# Patient Record
Sex: FEMALE | Race: WHITE | NOT HISPANIC OR LATINO | Employment: STUDENT | URBAN - METROPOLITAN AREA
[De-identification: names, ages, dates, MRNs, and addresses within clinical notes are randomized per-mention and may not be internally consistent; named-entity substitution may affect disease eponyms.]

---
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
## 2023-02-25 DIAGNOSIS — J45.31 MILD PERSISTENT ASTHMA WITH ACUTE EXACERBATION: ICD-10-CM

## 2023-02-28 NOTE — TELEPHONE ENCOUNTER
Pending Prescriptions:                       Disp   Refills    albuterol (PROAIR HFA/PROVENTIL HFA/VENTOL*8.5 g  0        Sig: INHALE 1-2 PUFFS INTO THE LUNGS EVERY 4 HOURS AS           NEEDED FOR SHORTNESS OF BREATH, DIFFICULTY           BREATHING OR WHEEZING.    Routing refill request to provider for review/approval because:  Patient needs to be seen because:  greater than 6 months since last appt

## 2023-03-01 RX ORDER — ALBUTEROL SULFATE 90 UG/1
AEROSOL, METERED RESPIRATORY (INHALATION)
Qty: 8.5 G | Refills: 0 | Status: SHIPPED | OUTPATIENT
Start: 2023-03-01 | End: 2023-05-18

## 2023-03-06 NOTE — TELEPHONE ENCOUNTER
Staff have attempted to reach patient on 3 occasions with no success.     ACT printed and placed in bin for mailing. Advised patient to return completed form to clinic.

## 2023-03-13 ASSESSMENT — ASTHMA QUESTIONNAIRES: ACT_TOTALSCORE: 16

## 2023-03-23 ENCOUNTER — TRANSFERRED RECORDS (OUTPATIENT)
Dept: HEALTH INFORMATION MANAGEMENT | Facility: CLINIC | Age: 18
End: 2023-03-23

## 2023-03-28 NOTE — PROGRESS NOTES
[facility] Medicine Clinic Visit - Interim History December 27, 2018    Initial Visit Date 12/11/2018  Initial Injury Date 11/28/18    PCP: Schoen, Gregory G Charlotte M Murphy is a 13  year old 8  month old female who is seen in follow up for a left ankle sprain. Since last visit on 12/11/2018 patient has had improvement. She has been out of the walking boot for 6 days. She has not done any activity yet.  She denies pain, stiffness, or soreness. She has been doing the range of motion exercises. She rates the pain at a  0/10 currently.  Symptoms are relieved with rest and walking boot (discontinued).  Symptoms are worsened by nothing at this time. She does continue to get some mild swelling. She feels that she would be able to get back to gymnastics slowly. She has gymnastics every day.    - Now ~ 4 weeks from initial injury      Review of Systems  Musculoskeletal: as above  Remainder of review of systems is negative including constitutional, eyes, ENT, CV, pulmonary, GI, , endocrine, skin, hematologic, and neurologic except as noted in HPI or medical history.    History reviewed. No pertinent past surgical/medical/family/social history other than as mentioned in HPI.    Patient Active Problem List   Diagnosis     Peanut allergy     Tibial plateau fracture, left, closed, initial encounter     Anxiety, Unspecified     Mild persistent asthma with acute exacerbation     Past Medical History:   Diagnosis Date     Mild intermittent asthma 10/16/2008     No past surgical history on file.  Family History   Problem Relation Age of Onset     Diabetes Maternal Grandfather      Respiratory Paternal Grandmother         lung cancer-smoker     Cancer Paternal Grandmother         lung cancer     Eye Disorder Maternal Grandmother      Social History     Socioeconomic History     Marital status: Single     Spouse name: Not on file     Number of children: Not on file     Years of education: Not on file     Highest education level:  "Not on file   Social Needs     Financial resource strain: Not on file     Food insecurity - worry: Not on file     Food insecurity - inability: Not on file     Transportation needs - medical: Not on file     Transportation needs - non-medical: Not on file   Occupational History     Not on file   Tobacco Use     Smoking status: Passive Smoke Exposure - Never Smoker     Smokeless tobacco: Never Used     Tobacco comment: when with grandparents   Substance and Sexual Activity     Alcohol use: No     Alcohol/week: 0.0 oz     Drug use: No     Sexual activity: No   Other Topics Concern     Not on file   Social History Narrative     Not on file         Current Outpatient Medications   Medication     albuterol (VENTOLIN HFA) 108 (90 Base) MCG/ACT inhaler     EPINEPHrine (EPIPEN/ADRENACLICK/OR ANY BX GENERIC EQUIV) 0.3 MG/0.3ML injection 2-pack     FLUoxetine (PROZAC) 10 MG capsule     fluticasone (FLOVENT HFA) 110 MCG/ACT inhaler     No current facility-administered medications for this visit.      Allergies   Allergen Reactions     Peanuts [Nuts] Rash         Objective:  BP 98/56   Ht 1.54 m (5' 0.63\")   Wt 56.2 kg (124 lb)   LMP 12/15/2018   BMI 23.72 kg/m      General: Alert and in no distress    Head: Normocephalic, atraumatic  Eyes: no scleral icterus or conjunctival erythema   Oropharynx:  Mucous membranes moist  Skin: no erythema, petechiae, or jaundice  CV: regular rhythm by palpation, 2+ distal pulses  Resp: normal respiratory effort without conversational dyspnea   Psych: normal mood and affect    Gait: Non-antalgic, appropriate coordination and balance   Neuro: Motor strength and sensation as noted below    Musculoskeletal:    Bilateral Foot and Ankle Exam:    Inspection:  -No swelling  -Bruising over dorsal left 3rd and 4th distal metatarsals    Tenderness:  None    ROM:        Full active ROM with ankle dorsiflexion, plantarflexion, inversion, eversion, great toe dorsiflexion, and great toe " plantarflexion    Strength:       ankle dorsiflexion 5/5 bilaterally       plantarflexion 5/5 bilaterally       inversion 5/5 bilaterally       eversion 5/5 bilaterally       great toe dorsiflexion 5/5 bilaterally       great toe plantarflexion 5/5 bilaterally    Special Tests:  -Able to do single leg hop without pain    Neurovascular:       2+ peripheral pulses bilaterally       sensation grossly intact      Radiology:  No new imaging obtained today  Recent Results (from the past 744 hour(s))   XR Ankle Left G/E 3 Views    Narrative    ANKLE LEFT THREE OR MORE VIEWS   11/28/2018 7:05 PM     INDICATION: Trauma.    COMPARISON: None.      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 This does not  appear to be an acute finding. No acute fracture or dislocation.    CHARLY CLEARY MD   X-ray lt Foot G/E 3 vws*    Narrative    FOOT LEFT THREE OR MORE VIEWS   11/28/2018 7:05 PM     INDICATION: Trauma.    COMPARISON: None.      Impression    IMPRESSION: Negative.    CHARLY CLEARY MD       Assessment:  1. Sprain of anterior talofibular ligament of left ankle, subsequent encounter        Plan:  Discussed the assessment with the patient and developed a plan together:  -Gradually return to gymnastics as able.  Letter provided.  -Continue home exercises.  -Ice or ice massage 15-20 minutes for pain relief or swelling as needed    -Follow up as needed.  Please call with questions or concerns.          Kiah Cabral MD, [facility]  [facility]       No

## 2023-05-18 ENCOUNTER — OFFICE VISIT (OUTPATIENT)
Dept: FAMILY MEDICINE | Facility: CLINIC | Age: 18
End: 2023-05-18
Payer: COMMERCIAL

## 2023-05-18 VITALS
DIASTOLIC BLOOD PRESSURE: 60 MMHG | RESPIRATION RATE: 18 BRPM | OXYGEN SATURATION: 99 % | BODY MASS INDEX: 19.98 KG/M2 | SYSTOLIC BLOOD PRESSURE: 102 MMHG | TEMPERATURE: 97.5 F | HEART RATE: 72 BPM | WEIGHT: 108.6 LBS | HEIGHT: 62 IN

## 2023-05-18 DIAGNOSIS — L72.9 INFECTED CYST OF SKIN: Primary | ICD-10-CM

## 2023-05-18 DIAGNOSIS — L08.9 INFECTED CYST OF SKIN: Primary | ICD-10-CM

## 2023-05-18 DIAGNOSIS — J45.31 MILD PERSISTENT ASTHMA WITH ACUTE EXACERBATION: ICD-10-CM

## 2023-05-18 DIAGNOSIS — J30.81 ALLERGIC RHINITIS DUE TO ANIMALS: ICD-10-CM

## 2023-05-18 DIAGNOSIS — L20.82 FLEXURAL ECZEMA: ICD-10-CM

## 2023-05-18 PROCEDURE — 99214 OFFICE O/P EST MOD 30 MIN: CPT | Performed by: STUDENT IN AN ORGANIZED HEALTH CARE EDUCATION/TRAINING PROGRAM

## 2023-05-18 RX ORDER — ALBUTEROL SULFATE 90 UG/1
1-2 AEROSOL, METERED RESPIRATORY (INHALATION) EVERY 4 HOURS PRN
Qty: 8.5 G | Refills: 1 | Status: SHIPPED | OUTPATIENT
Start: 2023-05-18 | End: 2024-02-06

## 2023-05-18 RX ORDER — FLUTICASONE PROPIONATE 110 UG/1
2 AEROSOL, METERED RESPIRATORY (INHALATION) 2 TIMES DAILY
Qty: 12 G | Refills: 1 | Status: SHIPPED | OUTPATIENT
Start: 2023-05-18 | End: 2024-05-24

## 2023-05-18 RX ORDER — INHALER, ASSIST DEVICES
SPACER (EA) MISCELLANEOUS
Qty: 1 EACH | Refills: 0 | Status: SHIPPED | OUTPATIENT
Start: 2023-05-18

## 2023-05-18 RX ORDER — CEPHALEXIN 500 MG/1
500 CAPSULE ORAL 2 TIMES DAILY
Qty: 14 CAPSULE | Refills: 0 | Status: SHIPPED | OUTPATIENT
Start: 2023-05-18 | End: 2023-05-25

## 2023-05-18 ASSESSMENT — ANXIETY QUESTIONNAIRES
4. TROUBLE RELAXING: NOT AT ALL
GAD7 TOTAL SCORE: 4
GAD7 TOTAL SCORE: 4
3. WORRYING TOO MUCH ABOUT DIFFERENT THINGS: NOT AT ALL
IF YOU CHECKED OFF ANY PROBLEMS ON THIS QUESTIONNAIRE, HOW DIFFICULT HAVE THESE PROBLEMS MADE IT FOR YOU TO DO YOUR WORK, TAKE CARE OF THINGS AT HOME, OR GET ALONG WITH OTHER PEOPLE: SOMEWHAT DIFFICULT
2. NOT BEING ABLE TO STOP OR CONTROL WORRYING: SEVERAL DAYS
GAD7 TOTAL SCORE: 4
8. IF YOU CHECKED OFF ANY PROBLEMS, HOW DIFFICULT HAVE THESE MADE IT FOR YOU TO DO YOUR WORK, TAKE CARE OF THINGS AT HOME, OR GET ALONG WITH OTHER PEOPLE?: SOMEWHAT DIFFICULT
1. FEELING NERVOUS, ANXIOUS, OR ON EDGE: SEVERAL DAYS
6. BECOMING EASILY ANNOYED OR IRRITABLE: SEVERAL DAYS
7. FEELING AFRAID AS IF SOMETHING AWFUL MIGHT HAPPEN: NOT AT ALL
7. FEELING AFRAID AS IF SOMETHING AWFUL MIGHT HAPPEN: NOT AT ALL
5. BEING SO RESTLESS THAT IT IS HARD TO SIT STILL: SEVERAL DAYS

## 2023-05-18 ASSESSMENT — ASTHMA QUESTIONNAIRES
QUESTION_2 LAST FOUR WEEKS HOW OFTEN HAVE YOU HAD SHORTNESS OF BREATH: ONCE A DAY
QUESTION_5 LAST FOUR WEEKS HOW WOULD YOU RATE YOUR ASTHMA CONTROL: SOMEWHAT CONTROLLED
QUESTION_3 LAST FOUR WEEKS HOW OFTEN DID YOUR ASTHMA SYMPTOMS (WHEEZING, COUGHING, SHORTNESS OF BREATH, CHEST TIGHTNESS OR PAIN) WAKE YOU UP AT NIGHT OR EARLIER THAN USUAL IN THE MORNING: ONCE OR TWICE
QUESTION_4 LAST FOUR WEEKS HOW OFTEN HAVE YOU USED YOUR RESCUE INHALER OR NEBULIZER MEDICATION (SUCH AS ALBUTEROL): ONE OR TWO TIMES PER DAY
ACT_TOTALSCORE: 15
QUESTION_1 LAST FOUR WEEKS HOW MUCH OF THE TIME DID YOUR ASTHMA KEEP YOU FROM GETTING AS MUCH DONE AT WORK, SCHOOL OR AT HOME: A LITTLE OF THE TIME
ACT_TOTALSCORE: 15

## 2023-05-18 ASSESSMENT — PATIENT HEALTH QUESTIONNAIRE - PHQ9
SUM OF ALL RESPONSES TO PHQ QUESTIONS 1-9: 6
SUM OF ALL RESPONSES TO PHQ QUESTIONS 1-9: 6
10. IF YOU CHECKED OFF ANY PROBLEMS, HOW DIFFICULT HAVE THESE PROBLEMS MADE IT FOR YOU TO DO YOUR WORK, TAKE CARE OF THINGS AT HOME, OR GET ALONG WITH OTHER PEOPLE: SOMEWHAT DIFFICULT

## 2023-05-18 ASSESSMENT — PAIN SCALES - GENERAL: PAINLEVEL: MILD PAIN (2)

## 2023-05-18 NOTE — PROGRESS NOTES
Assessment & Plan     Infected cyst of skin  Patient with infected cyst and appears to have surrounding cellulitis.  Did discuss I&D today.  Was able to drain with a significant amount of pus removed without making incision as things were already open.  No further underlying abscess palpated.  Encouraged her to let this continue to drain.  Plan for course of Keflex as she has no other risk factors for MRSA at this time.  Follow-up in clinic if things not completely resolved or recur.  - cephALEXin (KEFLEX) 500 MG capsule  Dispense: 14 capsule; Refill: 0    Mild persistent asthma with acute exacerbation  Symptoms uncontrolled at this time.  Recommend she restart Flovent and use her spacer with albuterol as needed.  Also discussed regarding Singulair but she did not note much improvement with this previously and wants to hold off now.  Continue to treat her allergies and avoid allergens.  - fluticasone (FLOVENT HFA) 110 MCG/ACT inhaler  Dispense: 12 g; Refill: 1  - albuterol (PROAIR HFA/PROVENTIL HFA/VENTOLIN HFA) 108 (90 Base) MCG/ACT inhaler  Dispense: 8.5 g; Refill: 1  - spacer (OPTICHAMBER CECILIO) holding chamber  Dispense: 1 each; Refill: 0    Allergic rhinitis due to animals  Continue antihistamine avoid allergens.     Flexural eczema  Treat allergies and use steroid cream as needed on her flexural surfaces.  Discussed regular lotion eating and avoidance of itching.      Acosta Mcneil MD  [facility]    Yosef Bellamy is a 18 year old, presenting for the following health issues:  Wound Check (Check wounds on her stomach, leg, and arm)        5/18/2023     9:18 AM   Additional Questions   Roomed by Sunita MURPHY     Wound Check    History of Present Illness       Reason for visit:  Infection  Symptom onset:  3-7 days ago  Symptoms include:  Redness & swelling  Symptom intensity:  Moderate  Symptom progression:  Worsening  Had these symptoms before:  No  What makes it worse:   "Leaning over  What makes it better:  Sky    She eats 0-1 servings of fruits and vegetables daily.She consumes 1 sweetened beverage(s) daily.She exercises with enough effort to increase her heart rate 10 to 19 minutes per day.  She exercises with enough effort to increase her heart rate 4 days per week.   She is taking medications regularly.    Today's PHQ-9         PHQ-9 Total Score: 6    PHQ-9 Q9 Thoughts of better off dead/self-harm past 2 weeks :   Not at all    How difficult have these problems made it for you to do your work, take care of things at home, or get along with other people: Somewhat difficult  Today's ALETHA-7 Score: 4         Review of Systems   Constitutional, HEENT, cardiovascular, pulmonary, gi and gu systems are negative, except as otherwise noted.      Objective    /60 (BP Location: Left arm, Patient Position: Chair)   Pulse 72   Temp 97.5  F (36.4  C) (Temporal)   Resp 18   Ht 1.577 m (5' 2.1\")   Wt 49.3 kg (108 lb 9.6 oz)   LMP 04/20/2023   SpO2 99%   BMI 19.80 kg/m    Body mass index is 19.8 kg/m .  Physical Exam   GENERAL: healthy, alert and no distress  EYES: Eyes grossly normal to inspection, PERRL and conjunctivae and sclerae normal  RESP: lungs clear to auscultation - no rales, rhonchi or wheezes  CV: regular rate and rhythm, normal S1 S2,   MS: no gross musculoskeletal defects noted, no edema  SKIN: Left lower abdominal cyst with surrounding erythema and drainage.  NEURO:  mentation intact and speech normal  PSYCH: mentation appears normal, affect normal/bright              "

## 2023-06-02 ENCOUNTER — HEALTH MAINTENANCE LETTER (OUTPATIENT)
Age: 18
End: 2023-06-02

## 2023-11-28 NOTE — Clinical Note
Gynecologic Annual Exam Note          GYN Annual Exam     Gynecologic Exam        Subjective     HPI  Yamel Don is a 43 y.o. female, , who presents for annual well woman exam as a established patient . Patient's last menstrual period was 2023 (exact date)..  Patient reports problems with: none.  Her periods occur every 25-35 days , lasting 4 days. The flow is moderate.. She reports dysmenorrhea is mild, occurring first 1-2 days of flow. Partner Status: Marital Status: . She is is sexually active. She has not had new partners.. STD testing recommendations have been explained to the patient and she does not desire STD testing. There were no changes to her medical or surgical history since her last visit..     Patient would like a mammogram ordered.    Additional OB/GYN History   Current contraception: contraceptive methods: None  Desires to: do not start contraception    Last Pap : 22. Result: negative. HPV: not done.   Last Completed Pap Smear            PAP SMEAR (Every 3 Years) Next due on 2025  LIQUID-BASED PAP SMEAR, [facility] ([facility])    2021  SCANNED - PAP SMEAR                  History of abnormal Pap smear: yes - HPV ---- last HPV testing was in =neg  Family history of uterine, colon, breast, or ovarian cancer: yes - mother - breast CA  Performs monthly Self-Breast Exam: no  Last mammogram: never. Needs a mammogram ordered     History of abnormal mammogram: no    Colonoscopy: has never had a colonoscopy.  Exercises Regularly: yes  Feelings of Anxiety or Depression: no  Tobacco Usage?: No       Current Outpatient Medications:     ibuprofen (ADVIL,MOTRIN) 600 MG tablet, Take 1 tablet by mouth Every 6 (Six) Hours As Needed for Mild Pain ., Disp: 35 tablet, Rfl: 1    multivitamin (THERAGRAN) tablet tablet, Take  by mouth Daily., Disp: , Rfl:     Omega-3 Fatty Acids (OMEGA 3 PO), Take 1 capsule by mouth Daily., Disp: , Rfl:     Probiotic  MG -thanks for this referral, Jesse. I am trying her on Zoloft instead of Prozac, to see if we can get some better improvement of her symptoms. Thank you,Jada Gunderson "Product (PROBIOTIC DAILY PO), Take 1 capsule by mouth Daily., Disp: , Rfl:     SUMAtriptan (IMITREX) 100 MG tablet, Take 1 tablet by mouth Daily As Needed., Disp: , Rfl:     valACYclovir (VALTREX) 500 MG tablet, 1 po qd, Disp: 90 tablet, Rfl: 3     Patient is requesting refills of Valtrex.    OB History          3    Para   1    Term   1       0    AB   2    Living   1         SAB   1    IAB   0    Ectopic   0    Molar        Multiple   0    Live Births   1                Past Medical History:   Diagnosis Date    HPV (human papilloma virus) infection     Infertility, female     unexplained    Migraine     Recurrent pregnancy loss, currently pregnant     miscarrigae x2        Past Surgical History:   Procedure Laterality Date    ANKLE SURGERY Left     Achilles rupture repair    DILATATION AND CURETTAGE  2014    OTHER SURGICAL HISTORY  2017    Oocyte retrieval for IVF    WISDOM TOOTH EXTRACTION         Health Maintenance   Topic Date Due    MAMMOGRAM  Never done    BMI FOLLOWUP  Never done    COLORECTAL CANCER SCREENING  Never done    TDAP/TD VACCINES (1 - Tdap) Never done    HEPATITIS C SCREENING  Never done    ANNUAL PHYSICAL  Never done    INFLUENZA VACCINE  Never done    COVID-19 Vaccine (2023- season) 2023    Annual Gynecologic Pelvic and Breast Exam  2024    PAP SMEAR  2025    Pneumococcal Vaccine 0-64  Aged Out       The additional following portions of the patient's history were reviewed and updated as appropriate: allergies, current medications, past family history, past medical history, past social history, past surgical history, and problem list.    Review of Systems   All other systems reviewed and are negative.        I have reviewed and agree with the HPI, ROS, and historical information as entered above. Radha Wheelerf, APRN          Objective   /80   Ht 157.5 cm (62\")   Wt 67.6 kg (149 lb)   LMP 2023 (Exact Date)   BMI 27.25 kg/m²     Physical " Exam  Vitals and nursing note reviewed. Exam conducted with a chaperone present.   Constitutional:       General: She is not in acute distress.     Appearance: Normal appearance. She is well-developed. She is not ill-appearing.   Neck:      Thyroid: No thyroid mass or thyromegaly.   Pulmonary:      Effort: Pulmonary effort is normal. No respiratory distress or retractions.   Chest:      Chest wall: No mass.   Breasts:     Right: Normal. No mass, nipple discharge, skin change or tenderness.      Left: Normal. No mass, nipple discharge, skin change or tenderness.   Abdominal:      General: There is no distension.      Palpations: Abdomen is soft. Abdomen is not rigid. There is no mass.      Tenderness: There is no abdominal tenderness. There is no guarding or rebound.      Hernia: No hernia is present. There is no hernia in the left inguinal area.   Genitourinary:     General: Normal vulva.      Labia:         Right: No rash, tenderness or lesion.         Left: No rash, tenderness or lesion.       Vagina: Normal. No vaginal discharge or lesions.      Cervix: Normal.      Uterus: Normal. Not enlarged, not fixed and not tender.       Adnexa: Right adnexa normal and left adnexa normal.        Right: No mass or tenderness.          Left: No mass or tenderness.        Rectum: No external hemorrhoid.   Musculoskeletal:      Cervical back: No muscular tenderness.   Skin:     General: Skin is warm and dry.   Neurological:      Mental Status: She is alert and oriented to person, place, and time.   Psychiatric:         Mood and Affect: Mood normal.         Behavior: Behavior normal.            Assessment and Plan    Problem List Items Addressed This Visit          Genitourinary and Reproductive     Hx of abnormal cervical Pap smear - Primary       Other    HSV (herpes simplex virus) anogenital infection    Relevant Medications    valACYclovir (VALTREX) 500 MG tablet    Genital herpes simplex    Relevant Medications     valACYclovir (VALTREX) 500 MG tablet     Other Visit Diagnoses       Women's annual routine gynecological examination        Breast cancer screening by mammogram        Relevant Orders    Mammo Screening Digital Tomosynthesis Bilateral With CAD    Screening for colon cancer        Relevant Orders    Cologuard - Stool, Per Rectum            GYN annual well woman exam.   Pap guidelines reviewed.  Agrees to Pap with HPV testing in one year per guidelines.  Reviewed monthly self breast exams.  Instructed to call with lumps, pain, or breast discharge.    Ordered Mammogram today  Reviewed exercise as a preventative health measures.   Reccommended Flu Vaccine in Fall of each year.  Symptoms of menopausal transition reviewed with patient.   RTC in 1 year or PRN with problems.  Discussed importance of routine colon cancer screening. Reviewed current guidelines. Recommended colonoscopy after age 45.  Return in about 1 year (around 11/28/2024) for Annual physical.     Radha Gamino, APRN  11/28/2023

## 2023-12-24 ENCOUNTER — APPOINTMENT (OUTPATIENT)
Dept: GENERAL RADIOLOGY | Facility: CLINIC | Age: 18
End: 2023-12-24
Attending: FAMILY MEDICINE
Payer: COMMERCIAL

## 2023-12-24 ENCOUNTER — HOSPITAL ENCOUNTER (EMERGENCY)
Facility: CLINIC | Age: 18
Discharge: HOME | End: 2023-12-24
Attending: FAMILY MEDICINE | Admitting: FAMILY MEDICINE
Payer: COMMERCIAL

## 2023-12-24 VITALS
OXYGEN SATURATION: 94 % | WEIGHT: 106.7 LBS | BODY MASS INDEX: 19.45 KG/M2 | TEMPERATURE: 99.1 F | SYSTOLIC BLOOD PRESSURE: 95 MMHG | HEART RATE: 90 BPM | DIASTOLIC BLOOD PRESSURE: 76 MMHG | RESPIRATION RATE: 16 BRPM

## 2023-12-24 DIAGNOSIS — J45.901 EXACERBATION OF ASTHMA, UNSPECIFIED ASTHMA SEVERITY, UNSPECIFIED WHETHER PERSISTENT: ICD-10-CM

## 2023-12-24 DIAGNOSIS — J10.1 INFLUENZA A: ICD-10-CM

## 2023-12-24 LAB
ALBUMIN SERPL BCG-MCNC: 4.4 G/DL (ref 3.5–5.2)
ALP SERPL-CCNC: 45 U/L (ref 40–150)
ALT SERPL W P-5'-P-CCNC: 10 U/L (ref 0–50)
ANION GAP SERPL CALCULATED.3IONS-SCNC: 12 MMOL/L (ref 7–15)
AST SERPL W P-5'-P-CCNC: 21 U/L (ref 0–45)
BASOPHILS # BLD AUTO: 0 10E3/UL (ref 0–0.2)
BASOPHILS NFR BLD AUTO: 0 %
BILIRUB SERPL-MCNC: 0.3 MG/DL
BUN SERPL-MCNC: 8.3 MG/DL (ref 6–20)
CALCIUM SERPL-MCNC: 8.3 MG/DL (ref 8.6–10)
CHLORIDE SERPL-SCNC: 102 MMOL/L (ref 98–107)
CREAT SERPL-MCNC: 0.91 MG/DL (ref 0.51–0.95)
D DIMER PPP FEU-MCNC: <0.27 UG/ML FEU (ref 0–0.5)
DEPRECATED HCO3 PLAS-SCNC: 23 MMOL/L (ref 22–29)
EGFRCR SERPLBLD CKD-EPI 2021: >90 ML/MIN/1.73M2
EOSINOPHIL # BLD AUTO: 0 10E3/UL (ref 0–0.7)
EOSINOPHIL NFR BLD AUTO: 0 %
ERYTHROCYTE [DISTWIDTH] IN BLOOD BY AUTOMATED COUNT: 12.7 % (ref 10–15)
FLUAV RNA SPEC QL NAA+PROBE: POSITIVE
FLUBV RNA RESP QL NAA+PROBE: NEGATIVE
GLUCOSE SERPL-MCNC: 109 MG/DL (ref 70–99)
HCG INTACT+B SERPL-ACNC: <1 MIU/ML
HCT VFR BLD AUTO: 36.8 % (ref 35–47)
HGB BLD-MCNC: 13 G/DL (ref 11.7–15.7)
HOLD SPECIMEN: NORMAL
HOLD SPECIMEN: NORMAL
IMM GRANULOCYTES # BLD: 0 10E3/UL
IMM GRANULOCYTES NFR BLD: 0 %
LYMPHOCYTES # BLD AUTO: 1 10E3/UL (ref 0.8–5.3)
LYMPHOCYTES NFR BLD AUTO: 13 %
MCH RBC QN AUTO: 32.2 PG (ref 26.5–33)
MCHC RBC AUTO-ENTMCNC: 35.3 G/DL (ref 31.5–36.5)
MCV RBC AUTO: 91 FL (ref 78–100)
MONOCYTES # BLD AUTO: 1 10E3/UL (ref 0–1.3)
MONOCYTES NFR BLD AUTO: 13 %
NEUTROPHILS # BLD AUTO: 5.1 10E3/UL (ref 1.6–8.3)
NEUTROPHILS NFR BLD AUTO: 74 %
NRBC # BLD AUTO: 0 10E3/UL
NRBC BLD AUTO-RTO: 0 /100
PLATELET # BLD AUTO: 260 10E3/UL (ref 150–450)
POTASSIUM SERPL-SCNC: 3.3 MMOL/L (ref 3.4–5.3)
PROT SERPL-MCNC: 6.9 G/DL (ref 6.4–8.3)
RBC # BLD AUTO: 4.04 10E6/UL (ref 3.8–5.2)
RSV RNA SPEC NAA+PROBE: NEGATIVE
SARS-COV-2 RNA RESP QL NAA+PROBE: NEGATIVE
SODIUM SERPL-SCNC: 137 MMOL/L (ref 135–145)
TROPONIN T SERPL HS-MCNC: <6 NG/L
WBC # BLD AUTO: 7.1 10E3/UL (ref 4–11)

## 2023-12-24 PROCEDURE — 93010 ELECTROCARDIOGRAM REPORT: CPT | Performed by: FAMILY MEDICINE

## 2023-12-24 PROCEDURE — 87637 SARSCOV2&INF A&B&RSV AMP PRB: CPT | Performed by: FAMILY MEDICINE

## 2023-12-24 PROCEDURE — 85025 COMPLETE CBC W/AUTO DIFF WBC: CPT | Performed by: FAMILY MEDICINE

## 2023-12-24 PROCEDURE — 93005 ELECTROCARDIOGRAM TRACING: CPT | Performed by: FAMILY MEDICINE

## 2023-12-24 PROCEDURE — 85379 FIBRIN DEGRADATION QUANT: CPT | Performed by: FAMILY MEDICINE

## 2023-12-24 PROCEDURE — 84702 CHORIONIC GONADOTROPIN TEST: CPT | Performed by: FAMILY MEDICINE

## 2023-12-24 PROCEDURE — 36415 COLL VENOUS BLD VENIPUNCTURE: CPT | Performed by: FAMILY MEDICINE

## 2023-12-24 PROCEDURE — 80053 COMPREHEN METABOLIC PANEL: CPT | Performed by: FAMILY MEDICINE

## 2023-12-24 PROCEDURE — 71045 X-RAY EXAM CHEST 1 VIEW: CPT

## 2023-12-24 PROCEDURE — 84484 ASSAY OF TROPONIN QUANT: CPT | Performed by: FAMILY MEDICINE

## 2023-12-24 PROCEDURE — 99285 EMERGENCY DEPT VISIT HI MDM: CPT | Mod: 25 | Performed by: FAMILY MEDICINE

## 2023-12-24 PROCEDURE — 99284 EMERGENCY DEPT VISIT MOD MDM: CPT | Mod: 25 | Performed by: FAMILY MEDICINE

## 2023-12-24 RX ORDER — PREDNISONE 20 MG/1
40 TABLET ORAL DAILY
Qty: 10 TABLET | Refills: 0 | Status: SHIPPED | OUTPATIENT
Start: 2023-12-24 | End: 2023-12-29

## 2023-12-24 ASSESSMENT — ACTIVITIES OF DAILY LIVING (ADL)
ADLS_ACUITY_SCORE: 35
ADLS_ACUITY_SCORE: 35

## 2023-12-24 NOTE — DISCHARGE INSTRUCTIONS
You tested positive for influenza A.  You do have a little bit of wheezing.  Use your albuterol inhaler as needed and take the prednisone 40 mg daily for the next 5 days to help calm down the inflammation in your breathing tubes.  If you are using your albuterol more than twice a week (which it sounds like you are) you should be on a controlling medication such as a steroid inhaler.  Recheck in clinic with your primary physician in the next 1-2 weeks to discuss this further.  Your blood work and chest x-ray were normal which is reassuring.  It was nice visiting with both of you tonight.  I hope you feel better soon. Thank you for being so patient with us during your ED stay.  We really do appreciate it. Kimberly Torres!    Thank you for choosing [facility]. We appreciate the opportunity to meet your urgent medical needs. Please let us know if we could have done anything to make your stay more satisfying.    After discharge, please closely monitor for any new or worsening symptoms. Return to the Emergency Department if you develop any acute worsening signs or symptoms.    If you had lab work, cultures or imaging studies done during your stay, the final results may still be pending. We will call you if your plan of care needs to change. However, if you are not improving as expected, please follow up with your primary care provider or clinic.     Start any prescription medications that were prescribed to you and take them as directed.     Please see additional handouts that may be pertinent to your condition.

## 2023-12-24 NOTE — ED TRIAGE NOTES
Pt has been having chest pain for the past two - four hours with SOB, radiates to her back.  Intermittent nausea with chest pain  Alert and oriented  Has      Triage Assessment (Adult)       Row Name 12/24/23 0021          Triage Assessment    Airway WDL WDL        Respiratory WDL    Respiratory WDL WDL        Cardiac WDL    Cardiac WDL X;chest pain        Chest Pain Assessment    Chest Pain Location midsternal     Chest Pain Radiation back     Character aching     Duration Chest pain four hours        Peripheral/Neurovascular WDL    Peripheral Neurovascular WDL WDL        Cognitive/Neuro/Behavioral WDL    Cognitive/Neuro/Behavioral WDL WDL

## 2023-12-24 NOTE — ED PROVIDER NOTES
History     Chief Complaint   Patient presents with    Shortness of Breath    Chest Pain     HPI  Mia Rey is a 18 year old female who presents to the ED tonight with a 4-hour history of chest pain that started gradually over about 20 minutes.  Fairly constant until the left for the ED just before midnight.  She had some associated shortness of breath and took her albuterol inhaler which may be helped a little bit.  She typically uses her inhaler once a day.  She was on a steroid inhaler many years ago but does not take that every day in fact it is usually a rare occurrence.  Has not seen a doctor in over a year.  Felt like she has some pressure like an air bubble in her chest.  No fevers.  Slight cough which has been nonproductive.    Had some abdominal cramps earlier.  No dysuria.  Last menstrual period was a week ago.  She is sexually active.  Uses no birth control.  Here with her fiancéVinayak.  He has not been ill.    Allergies:  Allergies   Allergen Reactions    Peanuts [Nuts] Rash       Problem List:    Patient Active Problem List    Diagnosis Date Noted    Deliberate self-cutting 04/04/2019     Priority: Medium    Trouble in sleeping 03/19/2019     Priority: Medium    Mild persistent asthma with acute exacerbation 09/11/2017     Priority: Medium    Mild major depression (H) 06/01/2017     Priority: Medium    ALETHA (generalized anxiety disorder) 06/01/2017     Priority: Medium    Tibial plateau fracture, left, closed, initial encounter 01/02/2017     Priority: Medium    Peanut allergy 08/12/2009     Priority: Medium        Past Medical History:    Past Medical History:   Diagnosis Date    Mild intermittent asthma 10/16/2008       Past Surgical History:    No past surgical history on file.    Family History:    Family History   Problem Relation Age of Onset    Diabetes Maternal Grandfather     Respiratory Paternal Grandmother         lung cancer-smoker    Cancer Paternal Grandmother         lung  cancer    Eye Disorder Maternal Grandmother        Social History:  Marital Status:  Single [1]  Social History     Tobacco Use    Smoking status: Never    Smokeless tobacco: Never    Tobacco comments:     no exposure   Vaping Use    Vaping Use: Never used   Substance Use Topics    Alcohol use: No     Alcohol/week: 0.0 standard drinks of alcohol    Drug use: No        Medications:    predniSONE (DELTASONE) 20 MG tablet  albuterol (PROAIR HFA/PROVENTIL HFA/VENTOLIN HFA) 108 (90 Base) MCG/ACT inhaler  EPINEPHrine (ANY BX GENERIC EQUIV) 0.3 MG/0.3ML injection 2-pack  fluticasone (CUTIVATE) 0.05 % external cream  fluticasone (FLOVENT HFA) 110 MCG/ACT inhaler  montelukast (SINGULAIR) 10 MG tablet  Prenatal Vit-Fe Fumarate-FA (PRENATAL MULTIVITAMIN W/IRON) 27-0.8 MG tablet  spacer (OPTICHAMBER CECILIO) holding chamber          Review of Systems   All other systems reviewed and are negative.      Physical Exam   BP: 115/78  Pulse: 105  Temp: 99.1  F (37.3  C)  Resp: 22  Weight: 48.4 kg (106 lb 11.2 oz)  SpO2: 96 %      Physical Exam  Constitutional:       General: She is not in acute distress.     Appearance: She is well-developed.   HENT:      Mouth/Throat:      Mouth: Mucous membranes are moist.   Cardiovascular:      Rate and Rhythm: Normal rate and regular rhythm.   Pulmonary:      Effort: Pulmonary effort is normal.      Breath sounds: Normal breath sounds.   Abdominal:      General: Abdomen is flat.      Tenderness: There is no abdominal tenderness.   Musculoskeletal:         General: No tenderness. Normal range of motion.      Right lower leg: No edema.      Left lower leg: No edema.   Skin:     Findings: Rash (atopy) present.   Neurological:      General: No focal deficit present.      Mental Status: She is alert and oriented to person, place, and time.         ED Course                 Procedures         EKG interpreted by me at 0102  Sinus rhythm at and 94 bpm.  No acute ischemic changes.  Normal EKG.    Critical  Care time:  none               Results for orders placed or performed during the hospital encounter of 12/24/23 (from the past 24 hour(s))   Symptomatic Influenza A/B, RSV, & SARS-CoV2 PCR (COVID-19) Nasopharyngeal    Specimen: Nasopharyngeal; Swab   Result Value Ref Range    Influenza A PCR Positive (A) Negative    Influenza B PCR Negative Negative    RSV PCR Negative Negative    SARS CoV2 PCR Negative Negative    Narrative    Testing was performed using the Xpert Xpress CoV2/Flu/RSV Assay on the [employer] Cardio GeneXpert Instrument. This test should be ordered for the detection of SARS-CoV-2, influenza, and RSV viruses in individuals who meet clinical and/or epidemiological criteria. Test performance is unknown in asymptomatic patients. This test is for in vitro diagnostic use under the FDA EUA for laboratories certified under CLIA to perform high or moderate complexity testing. This test has not been FDA cleared or approved. A negative result does not rule out the presence of PCR inhibitors in the specimen or target RNA in concentration below the limit of detection for the assay. If only one viral target is positive but coinfection with multiple targets is suspected, the sample should be re-tested with another FDA cleared, approved, or authorized test, if coinfection would change clinical management. This test was validated by the [email or web address]. These laboratories are certified under the Clinical Laboratory Improvement Amendments of 1988 (CLIA-88) as qualified to perform high complexity laboratory testing.   [place] Draw    Narrative    The following orders were created for panel order [place] Draw.  Procedure                               Abnormality         Status                     ---------                               -----------         ------                     Extra Green Top (Lithium...[[id number]]                      Final result               Extra Purple Top Tube[[id number]]                             Final result                 Please view results for these tests on the individual orders.   Extra Green Top (Lithium Heparin) Tube   Result Value Ref Range    Hold Specimen JIC    Extra Purple Top Tube   Result Value Ref Range    Hold Specimen JIC    CBC with platelets differential    Narrative    The following orders were created for panel order CBC with platelets differential.  Procedure                               Abnormality         Status                     ---------                               -----------         ------                     CBC with platelets and d...[[id number]]                      Final result                 Please view results for these tests on the individual orders.   Comprehensive metabolic panel   Result Value Ref Range    Sodium 137 135 - 145 mmol/L    Potassium 3.3 (L) 3.4 - 5.3 mmol/L    Carbon Dioxide (CO2) 23 22 - 29 mmol/L    Anion Gap 12 7 - 15 mmol/L    Urea Nitrogen 8.3 6.0 - 20.0 mg/dL    Creatinine 0.91 0.51 - 0.95 mg/dL    GFR Estimate >90 >60 mL/min/1.73m2    Calcium 8.3 (L) 8.6 - 10.0 mg/dL    Chloride 102 98 - 107 mmol/L    Glucose 109 (H) 70 - 99 mg/dL    Alkaline Phosphatase 45 40 - 150 U/L    AST 21 0 - 45 U/L    ALT 10 0 - 50 U/L    Protein Total 6.9 6.4 - 8.3 g/dL    Albumin 4.4 3.5 - 5.2 g/dL    Bilirubin Total 0.3 <=1.2 mg/dL   Troponin T, High Sensitivity   Result Value Ref Range    Troponin T, High Sensitivity <6 <=14 ng/L   CBC with platelets and differential   Result Value Ref Range    WBC Count 7.1 4.0 - 11.0 10e3/uL    RBC Count 4.04 3.80 - 5.20 10e6/uL    Hemoglobin 13.0 11.7 - 15.7 g/dL    Hematocrit 36.8 35.0 - 47.0 %    MCV 91 78 - 100 fL    MCH 32.2 26.5 - 33.0 pg    MCHC 35.3 31.5 - 36.5 g/dL    RDW 12.7 10.0 - 15.0 %    Platelet Count 260 150 - 450 10e3/uL    % Neutrophils 74 %    % Lymphocytes 13 %    % Monocytes 13 %    % Eosinophils 0 %    % Basophils 0 %    % Immature Granulocytes 0 %    NRBCs per 100 WBC 0 <1 /100    Absolute  Neutrophils 5.1 1.6 - 8.3 10e3/uL    Absolute Lymphocytes 1.0 0.8 - 5.3 10e3/uL    Absolute Monocytes 1.0 0.0 - 1.3 10e3/uL    Absolute Eosinophils 0.0 0.0 - 0.7 10e3/uL    Absolute Basophils 0.0 0.0 - 0.2 10e3/uL    Absolute Immature Granulocytes 0.0 <=0.4 10e3/uL    Absolute NRBCs 0.0 10e3/uL   HCG quantitative pregnancy   Result Value Ref Range    hCG Quantitative <1 <5 mIU/mL   D dimer quantitative   Result Value Ref Range    D-Dimer Quantitative <0.27 0.00 - 0.50 ug/mL FEU    Narrative    This D-dimer assay is intended for use in conjunction with a clinical pretest probability assessment model to exclude pulmonary embolism (PE) and deep venous thrombosis (DVT) in outpatients suspected of PE or DVT. The cut-off value is 0.50 ug/mL FEU.   XR Chest Port 1 View    Narrative    EXAM: CHEST SINGLE VIEW PORTABLE  LOCATION: [facility]  DATE: 12/24/2023    INDICATION: Chest pain. Shortness of breath. Influenza.  COMPARISON: None.    FINDINGS: The lungs are clear. Normal size cardiac silhouette.      Impression    IMPRESSION: No evidence of active cardiopulmonary disease.        Medications - No data to display    Assessments & Plan (with Medical Decision Making)  18-year-old with underlying asthma uses her albuterol almost daily even when she is doing well.  Is not taking her Flovent inhaler any longer nor her Singulair.  Has not seen a doctor in about a year she thinks.  Started with some chest pain gradually tonight.  Slight improvement with her albuterol inhaler.  Nonproductive cough.  Intermittent nausea.  Influenza came back positive.  COVID and RSV were negative.  Initially I heard no wheezing but later on in the ED stay she had some mild wheezing.  She will use her albuterol inhaler and we will get her on some prednisone 40 mg daily x 5 days.  Strong encouraged her to follow-up with her primary physician to discuss getting back on a steroid inhaler as she is using her albuterol  almost daily.  Blood work and chest x-ray are unremarkable.  We discussed reportable signs when to return.  Verbal and written discharge instructions given.  She is comfortable with this plan.       I have reviewed the nursing notes.    I have reviewed the findings, diagnosis, plan and need for follow up with the patient.           Medical Decision Making  The patient's presentation was of moderate complexity (an undiagnosed new problem with uncertain diagnosis).    The patient's evaluation involved:  ordering and/or review of 3+ test(s) in this encounter (see separate area of note for details)    The patient's management necessitated moderate risk (prescription drug management including medications given in the ED).        New Prescriptions    PREDNISONE (DELTASONE) 20 MG TABLET    Take 2 tablets (40 mg) by mouth daily for 5 days       Final diagnoses:   Influenza A   Exacerbation of asthma, unspecified asthma severity, unspecified whether persistent       12/24/2023   [facility] EMERGENCY DEPT       Marshal Alexander MD  12/24/23 0336

## 2024-02-06 ENCOUNTER — MYC REFILL (OUTPATIENT)
Dept: FAMILY MEDICINE | Facility: OTHER | Age: 19
End: 2024-02-06
Payer: COMMERCIAL

## 2024-02-06 ENCOUNTER — MYC REFILL (OUTPATIENT)
Dept: FAMILY MEDICINE | Facility: CLINIC | Age: 19
End: 2024-02-06
Payer: COMMERCIAL

## 2024-02-06 DIAGNOSIS — Z91.010 PEANUT ALLERGY: ICD-10-CM

## 2024-02-06 DIAGNOSIS — J45.31 MILD PERSISTENT ASTHMA WITH ACUTE EXACERBATION: ICD-10-CM

## 2024-02-06 RX ORDER — ALBUTEROL SULFATE 90 UG/1
1-2 AEROSOL, METERED RESPIRATORY (INHALATION) EVERY 4 HOURS PRN
Qty: 8.5 G | Refills: 1 | Status: SHIPPED | OUTPATIENT
Start: 2024-02-06 | End: 2024-04-18

## 2024-02-06 RX ORDER — EPINEPHRINE 0.3 MG/.3ML
INJECTION SUBCUTANEOUS
Qty: 1 ML | Refills: 2 | Status: SHIPPED | OUTPATIENT
Start: 2024-02-06

## 2024-04-18 ENCOUNTER — MYC REFILL (OUTPATIENT)
Dept: FAMILY MEDICINE | Facility: CLINIC | Age: 19
End: 2024-04-18
Payer: COMMERCIAL

## 2024-04-18 DIAGNOSIS — J45.31 MILD PERSISTENT ASTHMA WITH ACUTE EXACERBATION: ICD-10-CM

## 2024-04-20 RX ORDER — ALBUTEROL SULFATE 90 UG/1
1-2 AEROSOL, METERED RESPIRATORY (INHALATION) EVERY 4 HOURS PRN
Qty: 8.5 G | Refills: 1 | Status: SHIPPED | OUTPATIENT
Start: 2024-04-20 | End: 2024-05-24

## 2024-05-24 ENCOUNTER — MYC REFILL (OUTPATIENT)
Dept: FAMILY MEDICINE | Facility: CLINIC | Age: 19
End: 2024-05-24
Payer: COMMERCIAL

## 2024-05-24 DIAGNOSIS — J45.31 MILD PERSISTENT ASTHMA WITH ACUTE EXACERBATION: ICD-10-CM

## 2024-05-28 RX ORDER — FLUTICASONE PROPIONATE 110 UG/1
2 AEROSOL, METERED RESPIRATORY (INHALATION) 2 TIMES DAILY
Qty: 12 G | Refills: 1 | Status: SHIPPED | OUTPATIENT
Start: 2024-05-28 | End: 2024-08-21

## 2024-05-28 RX ORDER — ALBUTEROL SULFATE 90 UG/1
1-2 AEROSOL, METERED RESPIRATORY (INHALATION) EVERY 4 HOURS PRN
Qty: 8.5 G | Refills: 1 | Status: SHIPPED | OUTPATIENT
Start: 2024-05-28 | End: 2024-06-12

## 2024-05-30 ENCOUNTER — TELEPHONE (OUTPATIENT)
Dept: FAMILY MEDICINE | Facility: CLINIC | Age: 19
End: 2024-05-30
Payer: COMMERCIAL

## 2024-05-30 DIAGNOSIS — J45.31 MILD PERSISTENT ASTHMA WITH ACUTE EXACERBATION: Primary | ICD-10-CM

## 2024-05-30 NOTE — TELEPHONE ENCOUNTER
Prior Authorization Retail Medication Request    Medication/Dose:   Diagnosis and ICD code (if different than what is on RX):    New/renewal/insurance change PA/secondary ins. PA:  Previously Tried and Failed:    Rationale:      Insurance   Primary: [employer]   Insurance ID:  [id number]    Secondary (if applicable):  Insurance ID:      Pharmacy Information (if different than what is on RX)  [facility] [place]  Phone:  [phone]  Fax:[phone]

## 2024-06-05 ENCOUNTER — MYC MEDICAL ADVICE (OUTPATIENT)
Dept: FAMILY MEDICINE | Facility: CLINIC | Age: 19
End: 2024-06-05

## 2024-06-08 ASSESSMENT — ASTHMA QUESTIONNAIRES
ACT_TOTALSCORE: 15
ACT_TOTALSCORE: 15
QUESTION_5 LAST FOUR WEEKS HOW WOULD YOU RATE YOUR ASTHMA CONTROL: SOMEWHAT CONTROLLED
QUESTION_1 LAST FOUR WEEKS HOW MUCH OF THE TIME DID YOUR ASTHMA KEEP YOU FROM GETTING AS MUCH DONE AT WORK, SCHOOL OR AT HOME: A LITTLE OF THE TIME
QUESTION_2 LAST FOUR WEEKS HOW OFTEN HAVE YOU HAD SHORTNESS OF BREATH: ONCE A DAY
QUESTION_4 LAST FOUR WEEKS HOW OFTEN HAVE YOU USED YOUR RESCUE INHALER OR NEBULIZER MEDICATION (SUCH AS ALBUTEROL): ONE OR TWO TIMES PER DAY
QUESTION_3 LAST FOUR WEEKS HOW OFTEN DID YOUR ASTHMA SYMPTOMS (WHEEZING, COUGHING, SHORTNESS OF BREATH, CHEST TIGHTNESS OR PAIN) WAKE YOU UP AT NIGHT OR EARLIER THAN USUAL IN THE MORNING: ONCE OR TWICE

## 2024-06-11 NOTE — TELEPHONE ENCOUNTER
PA Initiation    Medication: FLUTICASONE PROPIONATE  MCG/ACT IN AERO  Insurance Company: [employer] [place] - Phone [phone] Fax [phone]  Pharmacy Filling the Rx: [address]   Filling Pharmacy Phone: [phone]  Filling Pharmacy Fax:    Start Date: 6/11/2024

## 2024-06-12 ENCOUNTER — MYC REFILL (OUTPATIENT)
Dept: FAMILY MEDICINE | Facility: CLINIC | Age: 19
End: 2024-06-12
Payer: COMMERCIAL

## 2024-06-12 DIAGNOSIS — J45.31 MILD PERSISTENT ASTHMA WITH ACUTE EXACERBATION: ICD-10-CM

## 2024-06-12 RX ORDER — ALBUTEROL SULFATE 90 UG/1
1-2 AEROSOL, METERED RESPIRATORY (INHALATION) EVERY 4 HOURS PRN
Qty: 18 G | Refills: 0 | Status: SHIPPED | OUTPATIENT
Start: 2024-06-12 | End: 2024-08-28

## 2024-06-13 NOTE — TELEPHONE ENCOUNTER
PRIOR AUTHORIZATION DENIED    Medication: FLUTICASONE PROPIONATE  MCG/ACT IN AERO  Insurance Company: [employer] [place] - Phone [phone] Fax [phone]  Denial Date: 6/13/2024  Denial Reason(s): Needs to try/fail a formulary alternative within the past 90 days      Appeal Information:   Patient Notified: No

## 2024-06-22 ENCOUNTER — HEALTH MAINTENANCE LETTER (OUTPATIENT)
Age: 19
End: 2024-06-22

## 2024-08-21 ENCOUNTER — OFFICE VISIT (OUTPATIENT)
Dept: ALLERGY | Facility: OTHER | Age: 19
End: 2024-08-21
Payer: COMMERCIAL

## 2024-08-21 VITALS
WEIGHT: 114.2 LBS | BODY MASS INDEX: 20.23 KG/M2 | HEART RATE: 71 BPM | HEIGHT: 63 IN | DIASTOLIC BLOOD PRESSURE: 65 MMHG | SYSTOLIC BLOOD PRESSURE: 105 MMHG | OXYGEN SATURATION: 100 %

## 2024-08-21 DIAGNOSIS — T78.1XXA REACTION TO FOOD, INITIAL ENCOUNTER: ICD-10-CM

## 2024-08-21 DIAGNOSIS — J45.40 MODERATE PERSISTENT ASTHMA WITHOUT COMPLICATION: ICD-10-CM

## 2024-08-21 DIAGNOSIS — T78.49XA OTHER ALLERGY, INITIAL ENCOUNTER: ICD-10-CM

## 2024-08-21 DIAGNOSIS — L30.9 DERMATITIS: ICD-10-CM

## 2024-08-21 DIAGNOSIS — J30.0 CHRONIC VASOMOTOR RHINITIS: Primary | ICD-10-CM

## 2024-08-21 LAB
BASOPHILS # BLD AUTO: 0.1 10E3/UL (ref 0–0.2)
BASOPHILS NFR BLD AUTO: 1 %
EOSINOPHIL # BLD AUTO: 0.4 10E3/UL (ref 0–0.7)
EOSINOPHIL NFR BLD AUTO: 8 %
ERYTHROCYTE [DISTWIDTH] IN BLOOD BY AUTOMATED COUNT: 12.1 % (ref 10–15)
HCT VFR BLD AUTO: 40.7 % (ref 35–47)
HGB BLD-MCNC: 14 G/DL (ref 11.7–15.7)
IMM GRANULOCYTES # BLD: 0 10E3/UL
IMM GRANULOCYTES NFR BLD: 0 %
LYMPHOCYTES # BLD AUTO: 2.5 10E3/UL (ref 0.8–5.3)
LYMPHOCYTES NFR BLD AUTO: 50 %
MCH RBC QN AUTO: 31.5 PG (ref 26.5–33)
MCHC RBC AUTO-ENTMCNC: 34.4 G/DL (ref 31.5–36.5)
MCV RBC AUTO: 92 FL (ref 78–100)
MONOCYTES # BLD AUTO: 0.4 10E3/UL (ref 0–1.3)
MONOCYTES NFR BLD AUTO: 8 %
NEUTROPHILS # BLD AUTO: 1.7 10E3/UL (ref 1.6–8.3)
NEUTROPHILS NFR BLD AUTO: 33 %
PLATELET # BLD AUTO: 292 10E3/UL (ref 150–450)
RBC # BLD AUTO: 4.44 10E6/UL (ref 3.8–5.2)
WBC # BLD AUTO: 5.1 10E3/UL (ref 4–11)

## 2024-08-21 PROCEDURE — 99204 OFFICE O/P NEW MOD 45 MIN: CPT | Performed by: ALLERGY & IMMUNOLOGY

## 2024-08-21 PROCEDURE — 99000 SPECIMEN HANDLING OFFICE-LAB: CPT | Performed by: ALLERGY & IMMUNOLOGY

## 2024-08-21 PROCEDURE — 86008 ALLG SPEC IGE RECOMB EA: CPT | Mod: 59 | Performed by: ALLERGY & IMMUNOLOGY

## 2024-08-21 PROCEDURE — 86003 ALLG SPEC IGE CRUDE XTRC EA: CPT | Mod: 90 | Performed by: ALLERGY & IMMUNOLOGY

## 2024-08-21 PROCEDURE — 85025 COMPLETE CBC W/AUTO DIFF WBC: CPT | Performed by: ALLERGY & IMMUNOLOGY

## 2024-08-21 PROCEDURE — 82785 ASSAY OF IGE: CPT | Performed by: ALLERGY & IMMUNOLOGY

## 2024-08-21 PROCEDURE — 36415 COLL VENOUS BLD VENIPUNCTURE: CPT | Performed by: ALLERGY & IMMUNOLOGY

## 2024-08-21 RX ORDER — BUDESONIDE AND FORMOTEROL FUMARATE DIHYDRATE 80; 4.5 UG/1; UG/1
2 AEROSOL RESPIRATORY (INHALATION) 2 TIMES DAILY
Qty: 10.2 G | Refills: 4 | Status: SHIPPED | OUTPATIENT
Start: 2024-08-21

## 2024-08-21 RX ORDER — TRIAMCINOLONE ACETONIDE 1 MG/G
OINTMENT TOPICAL
Qty: 80 G | Refills: 3 | Status: SHIPPED | OUTPATIENT
Start: 2024-08-21

## 2024-08-21 RX ORDER — AZELASTINE 1 MG/ML
2 SPRAY, METERED NASAL 2 TIMES DAILY PRN
Qty: 30 ML | Refills: 3 | Status: SHIPPED | OUTPATIENT
Start: 2024-08-21

## 2024-08-21 ASSESSMENT — ASTHMA QUESTIONNAIRES: ACT_TOTALSCORE: 15

## 2024-08-21 NOTE — PROGRESS NOTES
RN reviewed Anaphylaxis Action Plan with patient. Educated on the symptoms and treatment of anaphylaxis. Went through the different ways that a reaction can present, and the body systems that it can affect. Patient verbalized understanding. Copy given to patient.    LATANYA MartinezN, RN, PHN

## 2024-08-21 NOTE — LETTER
8/21/2024      Mia Rey  [address] [facility] [id number]      Dear Colleague,    Thank you for referring your patient, Mia Rey, to the [facility]. Please see a copy of my visit note below.    SUBJECTIVE:                                                                   Mia Rey presents today to our Allergy Clinic at [facility] for a new patient visit. She is a 19 year old female with concerns for food allergies and environmental allergies.    Chronic Rhinitis  The patient reports a long-standing history of chronic rhinitis, primarily during the spring, summer, and fall since childhood. Symptoms include nasal congestion, rhinorrhea, and occasional sneezing. These are typically triggered by exposure to fresh cut grass and animals such as cats, dogs, horses, and guinea pigs. However, she denies experiencing symptoms around her home pets. Symptom severity is usually rated at 4-5/10. She finds that over-the-counter loratadine provides effective symptom control, and she feels her nasal symptoms are not severe enough to warrant an allergy evaluation.    Asthma  Diagnosed with asthma during childhood, the patient denies any history of hospitalizations. Her asthma manifests as shortness of breath, wheezing, coughing, and occasional chest tightness. Triggers include allergens, exertion, cold air, and respiratory infections. She uses Qvar 40 mcg 1 puff twice daily but reports suboptimal symptom control, requiring daily use of albuterol, though there are times she skips it. Albuterol provides relief within five minutes. She experienced an asthma exacerbation in December 2023 during an influenza infection, which required prednisone. She occasionally smokes marijuana, approximately five times per year.    Food Allergy  The patient avoids both peanuts and tree nuts due to childhood reactions. She recalls experiencing hives and vomiting  after peanut exposure but does not recall specific reactions to tree nuts, though she avoids them. She also reports throat itchiness after consuming coconut years ago and has since avoided it. In 2005, her peanut IgE was 18.9 KU/L, increasing to >100 KU/L by 2007. She typically experiences bloating after consuming foods containing eggs and occasionally develops an itchy throat when consuming larger amounts. Her egg white IgE was 7.18 KU/L in 2005. Recently, she suspects a fish allergy, specifically to salmon, with symptoms including itchy throat and throat tightness within minutes of ingestion, occurring on at least two occasions. These symptoms resolve within an hour after taking oral antihistamines. She has an EpiPen but does not consistently carry it with her.    Dermatitis  The patient has a history of eczema, primarily affecting her hands, and occasionally the inside of her elbows and the backs of her knees. She experienced a severe flare approximately a year and a half ago, but her symptoms have since improved. She uses a steroid cream for management but is unsure of the specific type.                 Patient Active Problem List   Diagnosis     Peanut allergy     Tibial plateau fracture, left, closed, initial encounter     Mild major depression (H)     ALETHA (generalized anxiety disorder)     Mild persistent asthma with acute exacerbation     Trouble in sleeping     Deliberate self-cutting       Past Medical History:   Diagnosis Date     Mild intermittent asthma 10/16/2008      Problem (# of Occurrences) Relation (Name,Age of Onset)    Cancer (1) Paternal Grandmother: lung cancer    Diabetes (1) Maternal Grandfather    Eye Disorder (1) Maternal Grandmother    Respiratory (1) Paternal Grandmother: lung cancer-smoker          No past surgical history on file.  Social History     Socioeconomic History     Marital status: Single     Spouse name: None     Number of children: None     Years of education: None      Highest education level: None   Tobacco Use     Smoking status: Never     Smokeless tobacco: Never     Tobacco comments:     no exposure   Vaping Use     Vaping status: Never Used   Substance and Sexual Activity     Alcohol use: No     Alcohol/week: 0.0 standard drinks of alcohol     Drug use: No     Sexual activity: Yes     Partners: Male     Birth control/protection: Condom, Pill   Social History Narrative    ENVIRONMENTAL HISTORY: [place] The home is heated with a radiant heat. They does not have central air conditioning. The patient's bedroom is furnished with none.  Pets inside the house include 2 cat(s) and 3 dog(s). There is no history of cockroach or mice infestation. There is/are 1 smokers in the house.  The house does have a damp basement.                Current Outpatient Medications:      albuterol (PROAIR HFA/PROVENTIL HFA/VENTOLIN HFA) 108 (90 Base) MCG/ACT inhaler, Inhale 1-2 puffs into the lungs every 4 hours as needed for shortness of breath, wheezing or cough, Disp: 18 g, Rfl: 0     azelastine (ASTELIN) 0.1 % nasal spray, Spray 2 sprays into both nostrils 2 times daily as needed for rhinitis., Disp: 30 mL, Rfl: 3     budesonide-formoterol (SYMBICORT) 80-4.5 MCG/ACT Inhaler, Inhale 2 puffs into the lungs 2 times daily., Disp: 10.2 g, Rfl: 4     EPINEPHrine (ANY BX GENERIC EQUIV) 0.3 MG/0.3ML injection 2-pack, INJECT THE CONTENTS OF 1 SYRINGE (0.3ML) INTO THE MUSCLE ONCE AS NEEDED FOR ANAPHYLAXIS, Disp: 1 mL, Rfl: 2     fluticasone (CUTIVATE) 0.05 % external cream, Apply topically 2 times daily, Disp: 30 g, Rfl: 1     spacer (OPTICHAMBER CECILIO) holding chamber, Use with inhaler, Disp: 1 each, Rfl: 0     triamcinolone (KENALOG) 0.1 % external ointment, Apply sparingly to affected area twice daily as needed not longer than 14 days in a row. Do not apply on face, neck, armpits and groin area., Disp: 80 g, Rfl: 3     Prenatal Vit-Fe Fumarate-FA (PRENATAL  "MULTIVITAMIN W/IRON) 27-0.8 MG tablet, Take 1 tablet by mouth daily (Patient not taking: Reported on 5/18/2023), Disp: 90 tablet, Rfl: 3  Immunization History   Administered Date(s) Administered     Comvax (HIB/HepB) 2005, 2003, 03/30/2004     DTAP (<7y) 2003, 2003, 2003, 06/29/2004, 06/11/2008     HEPA 06/19/2007, 06/11/2008     HPV 05/01/2015, 07/17/2015, 01/27/2016     Influenza (H1N1) 11/16/2009     Influenza (IIV3) PF 2003, 11/14/2006, 10/15/2007, 10/16/2008, 10/11/2010, 11/04/2011, 12/27/2012     Influenza Vaccine >6 months,quad, PF 11/08/2013, 10/22/2014, 10/14/2015     MMR 03/30/2004, 06/11/2008     Meningococcal ACWY (Menactra ) 05/01/2015     Pneumococcal (PCV 7) 2003, 2003, 2003     Poliovirus, inactivated (IPV) 2003, 2003, 2003, 06/11/2008     TDAP Vaccine (Boostrix) 05/01/2015     Varicella 06/29/2004, 06/11/2008     Allergies   Allergen Reactions     Peanuts [Nuts] Rash     OBJECTIVE:                                                                 /65   Pulse 71   Ht 1.59 m (5' 2.6\")   Wt 51.8 kg (114 lb 3.2 oz)   SpO2 100%   BMI 20.49 kg/m              Physical Exam  Vitals and nursing note reviewed.   Constitutional:       General: She is not in acute distress.     Appearance: She is not ill-appearing, toxic-appearing or diaphoretic.   HENT:      Head: Normocephalic and atraumatic.      Right Ear: Tympanic membrane, ear canal and external ear normal.      Left Ear: Tympanic membrane, ear canal and external ear normal.      Nose: Septal deviation (rightward) present. No mucosal edema, congestion or rhinorrhea.      Right Turbinates: Enlarged and swollen.      Left Turbinates: Enlarged and swollen.      Comments: Transverse nasal crease noted      Mouth/Throat:      Lips: Pink.      Mouth: Mucous membranes are moist.      Pharynx: Oropharynx is clear. No pharyngeal swelling, oropharyngeal exudate, posterior oropharyngeal " erythema or uvula swelling.   Eyes:      General:         Right eye: No discharge.         Left eye: No discharge.      Conjunctiva/sclera: Conjunctivae normal.   Cardiovascular:      Rate and Rhythm: Normal rate and regular rhythm.      Heart sounds: Normal heart sounds. No murmur heard.  Pulmonary:      Effort: Pulmonary effort is normal. Prolonged expiration present. No respiratory distress.      Breath sounds: Normal breath sounds. No stridor, decreased air movement or transmitted upper airway sounds. No decreased breath sounds, wheezing, rhonchi or rales.   Skin:     Findings: Rash present.      Comments: Mild redness in the left antecubital area and fingers   Neurological:      Mental Status: She is alert and oriented to person, place, and time.   Psychiatric:         Mood and Affect: Mood normal.         Behavior: Behavior normal.             WORKUP:     ACT 15    ASSESSMENT/PLAN:           Chronic vasomotor rhinitis    Symptoms since childhood.  Unable to perform SPT for aeroallergens today because the patient to an antihistamine yesterday.  -Ordered serum IgE for regional aeroallergen panel.  - Take loratadine or cetirizine 10 mg by mouth once daily as needed.  - Use azelastine 2 sprays in each nostril twice daily as needed.    - IgE  - Allergen cat epithellium IgE  - Allergen dog epithelium IgE  - Allergen Nito grass IgE  - Allergen orchard grass IgE  - Allergen teto IgE  - Allergen D farinae IgE  - Allergen D pteronyssinus IgE  - Allergen alternaria alternata IgE  - Allergen aspergillus fumigatus IgE  - Allergen cladosporium herbarum IgE  - Allergen Epicoccum purpurascens IgE  - Allergen penicillium notatum IgE  - Allergen jessica white IgE  - Allergen Cedar IgE  - Allergen cottonwood IgE  - Allergen elm IgE  - Allergen maple box elder IgE  - Allergen oak white IgE  - Allergen Red Clermont IgE  - Allergen silver  birch IgE  - Allergen Tree White Clermont IgE  - Allergen Ennis Tree  - Allergen white  pine IgE  - Allergen English plantain IgE  - Allergen giant ragweed IgE  - Allergen lamb's quarter IgE  - Allergen Mugwort IgE  - Allergen ragweed short IgE  - Allergen Sagebrush Wormwood IgE  - Allergen Sheep Sorrel IgE  - Allergen thistle Russian IgE  - Allergen Weed Nettle IgE  - Allergen, Kochia/Firebush  - azelastine (ASTELIN) 0.1 % nasal spray  Dispense: 30 mL; Refill: 3      Moderate persistent asthma without complication  The patient's asthma symptoms remain suboptimally controlled with current therapy.      -Discontinue Qvar.  -Start Symbicort 80/4.5 mcg, 2 puffs twice daily.  -Continue use of albuterol inhaler, 2-4 puffs every 4 hours as needed for chest tightness, wheezing, shortness of breath, or persistent cough. Use with a spacer/chamber device for optimal delivery.    -Ordered serum IgE for regional aeroallergen panel, general serum IgE, and CBC with differential to identify potential triggers and assess for further treatment options, including biologics if necessary.  -Ordered chest x-ray (2 views) to evaluate any structural lung abnormalities.  -Ordered pulmonary function tests (PFTs) to be completed before the next appointment to assess baseline lung function and guide further treatment adjustments.    - CBC with Platelets & Differential  - XR Chest 2 Views  - General PFT Lab (Please always keep checked)  - Pulmonary Function Test  - budesonide-formoterol (SYMBICORT) 80-4.5 MCG/ACT Inhaler  Dispense: 10.2 g; Refill: 4      Other allergy, initial encounter  Historical diagnosis of peanut and tree nut allergy.  She was tested for egg in the past and that the test was positive as well.  The patient reports symptoms with eggs, like throat itchiness if she eats too many egg containing products.  The new concern is possible effusion allergy.  - Continue strict avoidance.  Ordered serum IgE for peanut, tree nuts, including coconut, egg white, and fish, including component resolved diagnostics were  appropriate.  - Advised to carry EpiPen with her all the time.  Anaphylaxis action plan was reviewed and provided.    - Allergen almonds IgE  - Allergen brazil nut IgE  - Allergen Brazil Nut rBer e 1 IgE  - Allergen cashew IgE  - Allergen Cashew nut rAna o 3 IgE  - Allergen hazelnut IgE  - Allergen pecan nut IgE  - Allergen pistachio nut IgE  - Allergen Vantage rJug r 1 IgE  - Allergen Vantage rJug r 3 IgE  - Allergen walnuts IgE  - Hazelnut Component Allergy Panel  - Allergen coconut IgE  - Allergen peanut IgE  - Peanut Component Allergy Panel  - Allergen catfish IgE  - Allergen codfish IgE  - Allergen flounder IgE  - Allergen Kristel IgE  - Allergen Halibut IgE  - Allergen salmon IgE  - Allergen Trout IgE  - Allergen tuna IgE  - Allergen Walleye Ozark IgE  - Egg Components Allergy Panel  - Allergen egg white IgE    Dermatitis  Skin care regimen reviewed with the patient: Eliminate harsh soaps, i.e., Dial, zest, Indy spring;  Use mild soaps such as Cetaphil or Dove sensitive skin, avoid hot or cold showers, aggressive use of moisturizers including Vanicream, Cetaphil or Aquaphor.   Use triamcinolone 0.1% ointment: Apply sparingly to affected area two times daily as needed but not more than 14 days in a row. Spare face, armpits, neck, and groin.   - triamcinolone (KENALOG) 0.1 % external ointment  Dispense: 80 g; Refill: 3    Follow-up in 2 months or sooner if needed.    Thank you for allowing us to participate in the care of this patient. Please feel free to contact us if there are any questions or concerns about the patient.    Disclaimer: This note consists of symbols derived from keyboarding, dictation and/or voice recognition software. As a result, there may be errors in the script that have gone undetected. Please consider this when interpreting information found in this chart.    Consent was obtained from the patient to use an AI documentation tool in the creation of this note.     Ishmael Kuhn MD, FAAAAI,  FACSHERLYNI  Allergy and Asthma     [facility]      RN reviewed Anaphylaxis Action Plan with patient. Educated on the symptoms and treatment of anaphylaxis. Went through the different ways that a reaction can present, and the body systems that it can affect. Patient verbalized understanding. Copy given to patient.    LATANYA MartinezN, RN, PHN           Again, thank you for allowing me to participate in the care of your patient.        Sincerely,        Ishmael Kuhn MD

## 2024-08-21 NOTE — LETTER
ANAPHYLAXIS ALLERGY PLAN    Name: Mia Rey      :  2005    Allergy to:  peanut, tree nuts, and work up for fish and egg    Weight: 114 lbs 3.17 oz           Asthma:  Yes  (higher risk for a severe reaction)      Do not depend on antihistamines or inhalers (bronchodilators) to treat a severe reaction; USE EPINEPHRINE      MEDICATIONS/DOSES  Epinephrine:  EpiPen/Adrenaclick/Auvi-Q   Epinephrine dose:  0.3 mg IM  Antihistamine:  Zyrtec (Cetirizine)  Antihistamine dose:  20 mg  Other (e.g., inhaler-bronchodilator if wheezing):  albuterol 4 puffs       ANAPHYLAXIS ALLERGY PLAN (Page 2)  Patient:  Mia Rey  :  2005         Electronically signed on 2024 by:  Ishmael Kuhn MD  Parent/Guardian Authorization Signature:  ___________________________ Date:    FORM PROVIDED [employer] OF FOOD ALLERGY RESEARCH & EDUCATION (FARE) ([email or web address]) 2017

## 2024-08-21 NOTE — PROGRESS NOTES
SUBJECTIVE:                                                                   Mia Rey presents today to our Allergy Clinic at [facility] for a new patient visit. She is a 19 year old female with concerns for food allergies and environmental allergies.    Chronic Rhinitis  The patient reports a long-standing history of chronic rhinitis, primarily during the spring, summer, and fall since childhood. Symptoms include nasal congestion, rhinorrhea, and occasional sneezing. These are typically triggered by exposure to fresh cut grass and animals such as cats, dogs, horses, and guinea pigs. However, she denies experiencing symptoms around her home pets. Symptom severity is usually rated at 4-5/10. She finds that over-the-counter loratadine provides effective symptom control, and she feels her nasal symptoms are not severe enough to warrant an allergy evaluation.    Asthma  Diagnosed with asthma during childhood, the patient denies any history of hospitalizations. Her asthma manifests as shortness of breath, wheezing, coughing, and occasional chest tightness. Triggers include allergens, exertion, cold air, and respiratory infections. She uses Qvar 40 mcg 1 puff twice daily but reports suboptimal symptom control, requiring daily use of albuterol, though there are times she skips it. Albuterol provides relief within five minutes. She experienced an asthma exacerbation in December 2023 during an influenza infection, which required prednisone. She occasionally smokes marijuana, approximately five times per year.    Food Allergy  The patient avoids both peanuts and tree nuts due to childhood reactions. She recalls experiencing hives and vomiting after peanut exposure but does not recall specific reactions to tree nuts, though she avoids them. She also reports throat itchiness after consuming coconut years ago and has since avoided it. In 2005, her peanut IgE was 18.9 KU/L, increasing to >100  KU/L by 2007. She typically experiences bloating after consuming foods containing eggs and occasionally develops an itchy throat when consuming larger amounts. Her egg white IgE was 7.18 KU/L in 2005. Recently, she suspects a fish allergy, specifically to salmon, with symptoms including itchy throat and throat tightness within minutes of ingestion, occurring on at least two occasions. These symptoms resolve within an hour after taking oral antihistamines. She has an EpiPen but does not consistently carry it with her.    Dermatitis  The patient has a history of eczema, primarily affecting her hands, and occasionally the inside of her elbows and the backs of her knees. She experienced a severe flare approximately a year and a half ago, but her symptoms have since improved. She uses a steroid cream for management but is unsure of the specific type.                 Patient Active Problem List   Diagnosis    Peanut allergy    Tibial plateau fracture, left, closed, initial encounter    Mild major depression (H)    ALETHA (generalized anxiety disorder)    Mild persistent asthma with acute exacerbation    Trouble in sleeping    Deliberate self-cutting       Past Medical History:   Diagnosis Date    Mild intermittent asthma 10/16/2008      Problem (# of Occurrences) Relation (Name,Age of Onset)    Cancer (1) Paternal Grandmother: lung cancer    Diabetes (1) Maternal Grandfather    Eye Disorder (1) Maternal Grandmother    Respiratory (1) Paternal Grandmother: lung cancer-smoker          No past surgical history on file.  Social History     Socioeconomic History    Marital status: Single     Spouse name: None    Number of children: None    Years of education: None    Highest education level: None   Tobacco Use    Smoking status: Never    Smokeless tobacco: Never    Tobacco comments:     no exposure   Vaping Use    Vaping status: Never Used   Substance and Sexual Activity    Alcohol use: No     Alcohol/week: 0.0 standard drinks of  alcohol    Drug use: No    Sexual activity: Yes     Partners: Male     Birth control/protection: Condom, Pill   Social History Narrative    ENVIRONMENTAL HISTORY: [place] The home is heated with a radiant heat. They does not have central air conditioning. The patient's bedroom is furnished with none.  Pets inside the house include 2 cat(s) and 3 dog(s). There is no history of cockroach or mice infestation. There is/are 1 smokers in the house.  The house does have a damp basement.                Current Outpatient Medications:     albuterol (PROAIR HFA/PROVENTIL HFA/VENTOLIN HFA) 108 (90 Base) MCG/ACT inhaler, Inhale 1-2 puffs into the lungs every 4 hours as needed for shortness of breath, wheezing or cough, Disp: 18 g, Rfl: 0    azelastine (ASTELIN) 0.1 % nasal spray, Spray 2 sprays into both nostrils 2 times daily as needed for rhinitis., Disp: 30 mL, Rfl: 3    budesonide-formoterol (SYMBICORT) 80-4.5 MCG/ACT Inhaler, Inhale 2 puffs into the lungs 2 times daily., Disp: 10.2 g, Rfl: 4    EPINEPHrine (ANY BX GENERIC EQUIV) 0.3 MG/0.3ML injection 2-pack, INJECT THE CONTENTS OF 1 SYRINGE (0.3ML) INTO THE MUSCLE ONCE AS NEEDED FOR ANAPHYLAXIS, Disp: 1 mL, Rfl: 2    fluticasone (CUTIVATE) 0.05 % external cream, Apply topically 2 times daily, Disp: 30 g, Rfl: 1    spacer (OPTICHAMBER CECILIO) holding chamber, Use with inhaler, Disp: 1 each, Rfl: 0    triamcinolone (KENALOG) 0.1 % external ointment, Apply sparingly to affected area twice daily as needed not longer than 14 days in a row. Do not apply on face, neck, armpits and groin area., Disp: 80 g, Rfl: 3    Prenatal Vit-Fe Fumarate-FA (PRENATAL MULTIVITAMIN W/IRON) 27-0.8 MG tablet, Take 1 tablet by mouth daily (Patient not taking: Reported on 5/18/2023), Disp: 90 tablet, Rfl: 3  Immunization History   Administered Date(s) Administered    Comvax (HIB/HepB) 2003, 2003, 03/30/2004    DTAP (<7y) 2003,  "2003, 2003, 06/29/2004, 06/11/2008    HEPA 06/19/2007, 06/11/2008    HPV 05/01/2015, 07/17/2015, 01/27/2016    Influenza (H1N1) 11/16/2009    Influenza (IIV3) PF 2003, 11/14/2006, 10/15/2007, 10/16/2008, 10/11/2010, 11/04/2011, 12/27/2012    Influenza Vaccine >6 months,quad, PF 11/08/2013, 10/22/2014, 10/14/2015    MMR 03/30/2004, 06/11/2008    Meningococcal ACWY (Menactra ) 05/01/2015    Pneumococcal (PCV 7) 2003, 2003, 2003    Poliovirus, inactivated (IPV) 2003, 2003, 2003, 06/11/2008    TDAP Vaccine (Boostrix) 05/01/2015    Varicella 06/29/2004, 06/11/2008     Allergies   Allergen Reactions    Peanuts [Nuts] Rash     OBJECTIVE:                                                                 /65   Pulse 71   Ht 1.59 m (5' 2.6\")   Wt 51.8 kg (114 lb 3.2 oz)   SpO2 100%   BMI 20.49 kg/m              Physical Exam  Vitals and nursing note reviewed.   Constitutional:       General: She is not in acute distress.     Appearance: She is not ill-appearing, toxic-appearing or diaphoretic.   HENT:      Head: Normocephalic and atraumatic.      Right Ear: Tympanic membrane, ear canal and external ear normal.      Left Ear: Tympanic membrane, ear canal and external ear normal.      Nose: Septal deviation (rightward) present. No mucosal edema, congestion or rhinorrhea.      Right Turbinates: Enlarged and swollen.      Left Turbinates: Enlarged and swollen.      Comments: Transverse nasal crease noted      Mouth/Throat:      Lips: Pink.      Mouth: Mucous membranes are moist.      Pharynx: Oropharynx is clear. No pharyngeal swelling, oropharyngeal exudate, posterior oropharyngeal erythema or uvula swelling.   Eyes:      General:         Right eye: No discharge.         Left eye: No discharge.      Conjunctiva/sclera: Conjunctivae normal.   Cardiovascular:      Rate and Rhythm: Normal rate and regular rhythm.      Heart sounds: Normal heart sounds. No murmur " heard.  Pulmonary:      Effort: Pulmonary effort is normal. Prolonged expiration present. No respiratory distress.      Breath sounds: Normal breath sounds. No stridor, decreased air movement or transmitted upper airway sounds. No decreased breath sounds, wheezing, rhonchi or rales.   Skin:     Findings: Rash present.      Comments: Mild redness in the left antecubital area and fingers   Neurological:      Mental Status: She is alert and oriented to person, place, and time.   Psychiatric:         Mood and Affect: Mood normal.         Behavior: Behavior normal.             WORKUP:     ACT 15    ASSESSMENT/PLAN:           Chronic vasomotor rhinitis    Symptoms since childhood.  Unable to perform SPT for aeroallergens today because the patient to an antihistamine yesterday.  -Ordered serum IgE for regional aeroallergen panel.  - Take loratadine or cetirizine 10 mg by mouth once daily as needed.  - Use azelastine 2 sprays in each nostril twice daily as needed.    - IgE  - Allergen cat epithellium IgE  - Allergen dog epithelium IgE  - Allergen [place]ito grass IgE  - Allergen orchard grass IgE  - Allergen teto IgE  - Allergen D farinae IgE  - Allergen D pteronyssinus IgE  - Allergen alternaria alternata IgE  - Allergen aspergillus fumigatus IgE  - Allergen cladosporium herbarum IgE  - Allergen Epicoccum purpurascens IgE  - Allergen penicillium notatum IgE  - Allergen jessica white IgE  - Allergen Cedar IgE  - Allergen cottonwood IgE  - Allergen elm IgE  - Allergen maple box elder IgE  - Allergen oak white IgE  - Allergen Red [place] IgE  - Allergen silver  birch IgE  - Allergen Tree White [place] IgE  - Allergen [place] Tree  - Allergen white pine IgE  - Allergen English plantain IgE  - Allergen giant ragweed IgE  - Allergen lamb's quarter IgE  - Allergen Mugwort IgE  - Allergen ragweed short IgE  - Allergen S[place] IgE  - Allergen Sheep Sorrel IgE  - Allergen thistle [place] IgE  - Allergen Weed Nettle IgE  -  Allergen, Kochia/Firebush  - azelastine (ASTELIN) 0.1 % nasal spray  Dispense: 30 mL; Refill: 3      Moderate persistent asthma without complication  The patient's asthma symptoms remain suboptimally controlled with current therapy.      -Discontinue Qvar.  -Start Symbicort 80/4.5 mcg, 2 puffs twice daily.  -Continue use of albuterol inhaler, 2-4 puffs every 4 hours as needed for chest tightness, wheezing, shortness of breath, or persistent cough. Use with a spacer/chamber device for optimal delivery.    -Ordered serum IgE for regional aeroallergen panel, general serum IgE, and CBC with differential to identify potential triggers and assess for further treatment options, including biologics if necessary.  -Ordered chest x-ray (2 views) to evaluate any structural lung abnormalities.  -Ordered pulmonary function tests (PFTs) to be completed before the next appointment to assess baseline lung function and guide further treatment adjustments.    - CBC with Platelets & Differential  - XR Chest 2 Views  - General PFT Lab (Please always keep checked)  - Pulmonary Function Test  - budesonide-formoterol (SYMBICORT) 80-4.5 MCG/ACT Inhaler  Dispense: 10.2 g; Refill: 4      Other allergy, initial encounter  Historical diagnosis of peanut and tree nut allergy.  She was tested for egg in the past and that the test was positive as well.  The patient reports symptoms with eggs, like throat itchiness if she eats too many egg containing products.  The new concern is possible effusion allergy.  - Continue strict avoidance.  Ordered serum IgE for peanut, tree nuts, including coconut, egg white, and fish, including component resolved diagnostics were appropriate.  - Advised to carry EpiPen with her all the time.  Anaphylaxis action plan was reviewed and provided.    - Allergen almonds IgE  - Allergen brazil nut IgE  - Allergen [place] Nut rBer e 1 IgE  - Allergen cashew IgE  - Allergen Cashew nut rAna o 3 IgE  - Allergen hazelnut IgE  -  Allergen pecan nut IgE  - Allergen pistachio nut IgE  - Allergen [place] rJug r 1 IgE  - Allergen [place] rJug r 3 IgE  - Allergen walnuts IgE  - Hazelnut Component Allergy Panel  - Allergen coconut IgE  - Allergen peanut IgE  - Peanut Component Allergy Panel  - Allergen catfish IgE  - Allergen codfish IgE  - Allergen flounder IgE  - Allergen Kristel IgE  - Allergen Halibut IgE  - Allergen salmon IgE  - Allergen Trout IgE  - Allergen tuna IgE  - Allergen [place] IgE  - Egg Components Allergy Panel  - Allergen egg white IgE    Dermatitis  Skin care regimen reviewed with the patient: Eliminate harsh soaps, i.e., Dial, zest, [place] spring;  Use mild soaps such as Cetaphil or Dove sensitive skin, avoid hot or cold showers, aggressive use of moisturizers including Vanicream, Cetaphil or Aquaphor.   Use triamcinolone 0.1% ointment: Apply sparingly to affected area two times daily as needed but not more than 14 days in a row. Spare face, armpits, neck, and groin.   - triamcinolone (KENALOG) 0.1 % external ointment  Dispense: 80 g; Refill: 3    Follow-up in 2 months or sooner if needed.    Thank you for allowing us to participate in the care of this patient. Please feel free to contact us if there are any questions or concerns about the patient.    Disclaimer: This note consists of symbols derived from keyboarding, dictation and/or voice recognition software. As a result, there may be errors in the script that have gone undetected. Please consider this when interpreting information found in this chart.    Consent was obtained from the patient to use an AI documentation tool in the creation of this note.     Ishmael Kuhn MD, FAAAAI, FACAAI  Allergy and Asthma     [facility]

## 2024-08-22 ENCOUNTER — HOSPITAL ENCOUNTER (OUTPATIENT)
Dept: GENERAL RADIOLOGY | Facility: CLINIC | Age: 19
Discharge: HOME | End: 2024-08-22
Attending: ALLERGY & IMMUNOLOGY | Admitting: ALLERGY & IMMUNOLOGY
Payer: COMMERCIAL

## 2024-08-22 DIAGNOSIS — J45.40 MODERATE PERSISTENT ASTHMA WITHOUT COMPLICATION: ICD-10-CM

## 2024-08-22 LAB
BRAZIL NUT (RBER E) 1 IGE QN: <0.1 KU(A)/L
IGE SERPL-ACNC: 2948 KU/L (ref 0–114)

## 2024-08-22 PROCEDURE — 71046 X-RAY EXAM CHEST 2 VIEWS: CPT

## 2024-08-23 LAB
A ALTERNATA IGE QN: 0.41 KU(A)/L
A FUMIGATUS IGE QN: 0.7 KU(A)/L
ALLERGEN CASHEW NUT RANA O 3 IGE: 87.8 KU(A)/L
BRAZIL NUT IGE QN: 64.5 KU(A)/L
CASHEW NUT IGE QN: >100 KU(A)/L
DEPRECATED MISC ALLERGEN IGE RAST QL: NORMAL
ENGLISH WALNUT (RJUG R) 3 IGE QN: 0.6 KU(A)/L
GUINEA PIG EPITH IGE QN: 2.73 KU/L
WHITE ASH IGE QN: 0.86 KU(A)/L

## 2024-08-23 NOTE — RESULT ENCOUNTER NOTE
NextGxDXt message sent:   chest x-ray:  No acute cardiopulmonary changes.  -No changes in the plan.

## 2024-08-24 LAB
ALLERGEN WALNUT RJUG R 1 IGE: 90.2 KU(A)/L
ALMOND IGE QN: >100 KU(A)/L
C HERBARUM IGE QN: 5.23 KU(A)/L
CALIF WALNUT POLN IGE QN: 0.84 KU(A)/L
CAT DANDER IGG QN: >100 KU(A)/L
CATFISH IGE QN: 27 KU(A)/L
CEDAR IGE QN: 0.45 KU(A)/L
COCKSFOOT IGE QN: 21.2 KU(A)/L
COCONUT IGE QN: >100 KU(A)/L
CODFISH IGE QN: 25.5 KU(A)/L
COMMON RAGWEED IGE QN: 20 KU(A)/L
COR A 14 STORAGE PROTEIN 2S HAZELNUT: 36.2 KU(A)/L
COTTONWOOD IGE QN: 0.52 KU(A)/L
D FARINAE IGE QN: 0.24 KU(A)/L
D PTERONYSS IGE QN: 0.67 KU(A)/L
DOG DANDER+EPITH IGE QN: >100 KU(A)/L
E PURPURASCENS IGE QN: 0.92 KU(A)/L
EAST WHITE PINE IGE QN: 0.54 KU(A)/L
EGG WHITE IGE QN: 4.27 KU(A)/L
ENGL PLANTAIN IGE QN: 0.58 KU(A)/L
FIREBUSH IGE QN: 0.4 KU(A)/L
FLOUNDER IGE QN: 13.4 KU(A)/L
GIANT RAGWEED IGE QN: 8.67 KU(A)/L
GOOSEFOOT IGE QN: 0.72 KU(A)/L
HADDOCK IGE QN: 28.6 KU(A)/L
HALIBUT IGE QN: 20.1 KU(A)/L
HAZELNUT (NCOR A) 9 IGE QN: 97.9 KU(A)/L
HAZELNUT (RCOR A) 1 IGE QN: 34.1 KU(A)/L
HAZELNUT (RCOR A) 8 IGE QN: 0.73 KU(A)/L
HAZELNUT IGE QN: >100 KU(A)/L
HORSE DANDER IGE QN: 8.82 KU(A)/L
JOHNSON GRASS IGE QN: 8.57 KU(A)/L
MAPLE IGE QN: 2.27 KU(A)/L
MUGWORT IGE QN: 3.38 KU(A)/L
NETTLE IGE QN: 2.4 KU(A)/L
OVALB IGE QN: 0.22 KU(A)/L
OVOMUCOID IGE QN: 0.17 KU(A)/L
P NOTATUM IGE QN: <0.1 KU(A)/L
PEANUT (RARA H) 1 IGE QN: >100 KU(A)/L
PEANUT (RARA H) 2 IGE QN: >100 KU(A)/L
PEANUT (RARA H) 3 IGE QN: 45.7 KU(A)/L
PEANUT (RARA H) 6 IGE QN: 97.9 KU(A)/L
PEANUT (RARA H) 8 IGE QN: 3.77 KU(A)/L
PEANUT (RARA H) 9 IGE QN: 2.61 KU(A)/L
PEANUT IGE QN: >100 KU(A)/L
PECAN/HICK NUT IGE QN: 56.3 KU(A)/L
PISTACHIO IGE QN: >100 KU(A)/L
RABBIT EPITH IGE QN: 0.57 KU(A)/L
RED MULBERRY IGE QN: 1.11 KU(A)/L
SALMON IGE QN: 22.4 KU(A)/L
SALTWORT IGE QN: 0.53 KU(A)/L
SHEEP SORREL IGE QN: 0.63 KU(A)/L
SILVER BIRCH IGE QN: 29.9 KU(A)/L
TIMOTHY IGE QN: 14.2 KU(A)/L
TROUT IGE QN: 22.6 KU(A)/L
TUNA IGE QN: 6.59 KU(A)/L
WALLEYE PIKE IGE QN: 46.1 KU(A)/L
WALNUT IGE QN: 91.4 KU(A)/L
WHITE ELM IGE QN: 2.45 KU(A)/L
WHITE MULBERRY IGE QN: 0.46 KU(A)/L
WHITE OAK IGE QN: 1.92 KU(A)/L
WORMWOOD IGE QN: 3.37 KU(A)/L

## 2024-08-25 NOTE — RESULT ENCOUNTER NOTE
[employer] message sent:   Elevated total serum IgE, which is not uncommon for the patients with allergic rhinitis, asthma, food allergies, and/or eczema.  Positive serum IgE for cat, dog, horse dander, and guinea pig molds, grass pollen, tree pollen, and weed pollen.  Mild sensitivity to rabbit.  -Recommend avoidance measures.  -No changes in the previously discussed treatment plan.    Positive serum IgE for peanuts, tree nuts, coconut, fish, and egg.  Considering component resolved diagnostics for egg, I would consider skin test, and then oral food challenge test in the office setting.  - Continue strict avoidance of peanuts, tree nuts, coconut, and fish.

## 2024-08-28 ENCOUNTER — OFFICE VISIT (OUTPATIENT)
Dept: FAMILY MEDICINE | Facility: CLINIC | Age: 19
End: 2024-08-28
Payer: COMMERCIAL

## 2024-08-28 VITALS
SYSTOLIC BLOOD PRESSURE: 110 MMHG | HEART RATE: 72 BPM | HEIGHT: 62 IN | DIASTOLIC BLOOD PRESSURE: 60 MMHG | TEMPERATURE: 97.9 F | RESPIRATION RATE: 16 BRPM | BODY MASS INDEX: 20.74 KG/M2 | WEIGHT: 112.7 LBS | OXYGEN SATURATION: 99 %

## 2024-08-28 DIAGNOSIS — Z91.010 PEANUT ALLERGY: ICD-10-CM

## 2024-08-28 DIAGNOSIS — Z11.59 NEED FOR HEPATITIS C SCREENING TEST: ICD-10-CM

## 2024-08-28 DIAGNOSIS — R21 RASH: ICD-10-CM

## 2024-08-28 DIAGNOSIS — Z11.3 SCREENING FOR STDS (SEXUALLY TRANSMITTED DISEASES): ICD-10-CM

## 2024-08-28 DIAGNOSIS — Z00.00 ROUTINE GENERAL MEDICAL EXAMINATION AT A HEALTH CARE FACILITY: Primary | ICD-10-CM

## 2024-08-28 DIAGNOSIS — Z11.4 SCREENING FOR HIV (HUMAN IMMUNODEFICIENCY VIRUS): ICD-10-CM

## 2024-08-28 DIAGNOSIS — J45.31 MILD PERSISTENT ASTHMA WITH ACUTE EXACERBATION: ICD-10-CM

## 2024-08-28 DIAGNOSIS — Z12.4 CERVICAL CANCER SCREENING: ICD-10-CM

## 2024-08-28 DIAGNOSIS — F33.41 RECURRENT MAJOR DEPRESSIVE DISORDER, IN PARTIAL REMISSION (H): ICD-10-CM

## 2024-08-28 DIAGNOSIS — J30.81 ALLERGIC RHINITIS DUE TO ANIMALS: ICD-10-CM

## 2024-08-28 PROCEDURE — 99395 PREV VISIT EST AGE 18-39: CPT | Performed by: STUDENT IN AN ORGANIZED HEALTH CARE EDUCATION/TRAINING PROGRAM

## 2024-08-28 RX ORDER — ALBUTEROL SULFATE 90 UG/1
1-2 AEROSOL, METERED RESPIRATORY (INHALATION) EVERY 4 HOURS PRN
Qty: 18 G | Refills: 4 | Status: SHIPPED | OUTPATIENT
Start: 2024-08-28

## 2024-08-28 RX ORDER — ALBUTEROL SULFATE 90 UG/1
1-2 AEROSOL, METERED RESPIRATORY (INHALATION) EVERY 4 HOURS PRN
Qty: 18 G | Refills: 0 | Status: SHIPPED | OUTPATIENT
Start: 2024-08-28 | End: 2024-08-28

## 2024-08-28 SDOH — HEALTH STABILITY: PHYSICAL HEALTH: ON AVERAGE, HOW MANY DAYS PER WEEK DO YOU ENGAGE IN MODERATE TO STRENUOUS EXERCISE (LIKE A BRISK WALK)?: 4 DAYS

## 2024-08-28 SDOH — HEALTH STABILITY: PHYSICAL HEALTH: ON AVERAGE, HOW MANY MINUTES DO YOU ENGAGE IN EXERCISE AT THIS LEVEL?: 40 MIN

## 2024-08-28 ASSESSMENT — ANXIETY QUESTIONNAIRES
8. IF YOU CHECKED OFF ANY PROBLEMS, HOW DIFFICULT HAVE THESE MADE IT FOR YOU TO DO YOUR WORK, TAKE CARE OF THINGS AT HOME, OR GET ALONG WITH OTHER PEOPLE?: NOT DIFFICULT AT ALL
7. FEELING AFRAID AS IF SOMETHING AWFUL MIGHT HAPPEN: NOT AT ALL
5. BEING SO RESTLESS THAT IT IS HARD TO SIT STILL: NOT AT ALL
GAD7 TOTAL SCORE: 0
4. TROUBLE RELAXING: NOT AT ALL
GAD7 TOTAL SCORE: 0
IF YOU CHECKED OFF ANY PROBLEMS ON THIS QUESTIONNAIRE, HOW DIFFICULT HAVE THESE PROBLEMS MADE IT FOR YOU TO DO YOUR WORK, TAKE CARE OF THINGS AT HOME, OR GET ALONG WITH OTHER PEOPLE: NOT DIFFICULT AT ALL
2. NOT BEING ABLE TO STOP OR CONTROL WORRYING: NOT AT ALL
1. FEELING NERVOUS, ANXIOUS, OR ON EDGE: NOT AT ALL
GAD7 TOTAL SCORE: 0
3. WORRYING TOO MUCH ABOUT DIFFERENT THINGS: NOT AT ALL
6. BECOMING EASILY ANNOYED OR IRRITABLE: NOT AT ALL
7. FEELING AFRAID AS IF SOMETHING AWFUL MIGHT HAPPEN: NOT AT ALL

## 2024-08-28 ASSESSMENT — PATIENT HEALTH QUESTIONNAIRE - PHQ9
10. IF YOU CHECKED OFF ANY PROBLEMS, HOW DIFFICULT HAVE THESE PROBLEMS MADE IT FOR YOU TO DO YOUR WORK, TAKE CARE OF THINGS AT HOME, OR GET ALONG WITH OTHER PEOPLE: NOT DIFFICULT AT ALL
SUM OF ALL RESPONSES TO PHQ QUESTIONS 1-9: 2
SUM OF ALL RESPONSES TO PHQ QUESTIONS 1-9: 2

## 2024-08-28 ASSESSMENT — SOCIAL DETERMINANTS OF HEALTH (SDOH)
HOW OFTEN DO YOU GET TOGETHER WITH FRIENDS OR RELATIVES?: TWICE A WEEK
HOW OFTEN DO YOU GET TOGETHER WITH FRIENDS OR RELATIVES?: TWICE A WEEK

## 2024-08-28 ASSESSMENT — PAIN SCALES - GENERAL: PAINLEVEL: NO PAIN (0)

## 2024-08-28 NOTE — PATIENT INSTRUCTIONS
Patient Education   Preventive Care Advice   This is general advice given by our system to help you stay healthy. However, your care team may have specific advice just for you. Please talk to your care team about your preventive care needs.  Nutrition  Eat 5 or more servings of fruits and vegetables each day.  Try wheat bread, brown rice and whole grain pasta (instead of white bread, rice, and pasta).  Get enough calcium and vitamin D. Check the label on foods and aim for 100% of the RDA (recommended daily allowance).  Lifestyle  Exercise at least 150 minutes each week  (30 minutes a day, 5 days a week).  Do muscle strengthening activities 2 days a week. These help control your weight and prevent disease.  No smoking.  Wear sunscreen to prevent skin cancer.  Have a dental exam and cleaning every 6 months.  Yearly exams  See your health care team every year to talk about:  Any changes in your health.  Any medicines your care team has prescribed.  Preventive care, family planning, and ways to prevent chronic diseases.  Shots (vaccines)   HPV shots (up to age 26), if you've never had them before.  Hepatitis B shots (up to age 59), if you've never had them before.  COVID-19 shot: Get this shot when it's due.  Flu shot: Get a flu shot every year.  Tetanus shot: Get a tetanus shot every 10 years.  Pneumococcal, hepatitis A, and RSV shots: Ask your care team if you need these based on your risk.  Shingles shot (for age 50 and up)  General health tests  Diabetes screening:  Starting at age 35, Get screened for diabetes at least every 3 years.  If you are younger than age 35, ask your care team if you should be screened for diabetes.  Cholesterol test: At age 39, start having a cholesterol test every 5 years, or more often if advised.  Bone density scan (DEXA): At age 50, ask your care team if you should have this scan for osteoporosis (brittle bones).  Hepatitis C: Get tested at least once in your life.  STIs (sexually  transmitted infections)  Before age 24: Ask your care team if you should be screened for STIs.  After age 24: Get screened for STIs if you're at risk. You are at risk for STIs (including HIV) if:  You are sexually active with more than one person.  You don't use condoms every time.  You or a partner was diagnosed with a sexually transmitted infection.  If you are at risk for HIV, ask about PrEP medicine to prevent HIV.  Get tested for HIV at least once in your life, whether you are at risk for HIV or not.  Cancer screening tests  Cervical cancer screening: If you have a cervix, begin getting regular cervical cancer screening tests starting at age 21.  Breast cancer scan (mammogram): If you've ever had breasts, begin having regular mammograms starting at age 40. This is a scan to check for breast cancer.  Colon cancer screening: It is important to start screening for colon cancer at age 45.  Have a colonoscopy test every 10 years (or more often if you're at risk) Or, ask your provider about stool tests like a FIT test every year or Cologuard test every 3 years.  To learn more about your testing options, visit:   .  For help making a decision, visit:   https://bit.ly/te34904.  Prostate cancer screening test: If you have a prostate, ask your care team if a prostate cancer screening test (PSA) at age 55 is right for you.  Lung cancer screening: If you are a current or former smoker ages 50 to 80, ask your care team if ongoing lung cancer screenings are right for you.  For informational purposes only. Not to replace the advice of your health care provider. Copyright   2023 [employer]. All rights reserved. Clinically reviewed by the [facility] Transitions Program. [employer] (now [employer] 127026 - REV 01/24.

## 2024-08-28 NOTE — PROGRESS NOTES
Preventive Care Visit  [facility]  Acosta Mcneil MD, Family Medicine  Aug 28, 2024      Assessment & Plan   Problem List Items Addressed This Visit          Respiratory    Mild persistent asthma with acute exacerbation - Primary    Relevant Medications    albuterol (PROAIR HFA/PROVENTIL HFA/VENTOLIN HFA) 108 (90 Base) MCG/ACT inhaler       Behavioral    Mild major depression (H)       Other    Peanut allergy    Relevant Medications    albuterol (PROAIR HFA/PROVENTIL HFA/VENTOLIN HFA) 108 (90 Base) MCG/ACT inhaler     Other Visit Diagnoses       Screening for STDs (sexually transmitted diseases)        Screening for HIV (human immunodeficiency virus)        Need for hepatitis C screening test        Cervical cancer screening        Allergic rhinitis due to animals        Relevant Medications    albuterol (PROAIR HFA/PROVENTIL HFA/VENTOLIN HFA) 108 (90 Base) MCG/ACT inhaler    Rash        Relevant Medications    albuterol (PROAIR HFA/PROVENTIL HFA/VENTOLIN HFA) 108 (90 Base) MCG/ACT inhaler           Age-appropriate screening and immunization discussed.  Patient does not want Pap smear today.  Starting Symbicort now and hopes to get more control of her asthma.  Refill albuterol.  Allergy testing done with immunology and getting spirometry.  Small spot on her abdomen that may be tinea.  Plan for trial of antifungal and let me know if not improving.    Patient has been advised of split billing requirements and indicates understanding: Yes       Counseling  Appropriate preventive services were addressed with this patient via screening, questionnaire, or discussion as appropriate for fall prevention, nutrition, physical activity, Tobacco-use cessation, social engagement, weight loss and cognition.  Checklist reviewing preventive services available has been given to the patient.  Reviewed patient's diet, addressing concerns and/or questions.   She is at risk for psychosocial distress  and has been provided with information to reduce risk.         Yosef Bellamy is a 19 year old, presenting for the following:  Physical        8/28/2024     1:32 PM   Additional Questions   Roomed by Sunita MURPHY        Health Care Directive  Patient does not have a Health Care Directive or Living Will: Discussed advance care planning with patient; information given to patient to review.    HPI        8/28/2024   General Health   How would you rate your overall physical health? Good   Feel stress (tense, anxious, or unable to sleep) Only a little      (!) STRESS CONCERN      8/28/2024   Nutrition   Three or more servings of calcium each day? Yes   Diet: Regular (no restrictions)   How many servings of fruit and vegetables per day? (!) 0-1   How many sweetened beverages each day? 0-1            8/28/2024   Exercise   Days per week of moderate/strenous exercise 4 days   Average minutes spent exercising at this level 40 min            8/28/2024   Social Factors   Frequency of gathering with friends or relatives Twice a week   Worry food won't last until get money to buy more No   Food not last or not have enough money for food? No   Do you have housing? (Housing is defined as stable permanent housing and does not include staying ouside in a car, in a tent, in an abandoned building, in an overnight shelter, or couch-surfing.) Yes   Are you worried about losing your housing? No   Lack of transportation? No   Unable to get utilities (heat,electricity)? No            8/28/2024   Dental   Dentist two times every year? Yes            8/28/2024   TB Screening   Were you born outside of the [place]? No          Today's PHQ-9 Score:       8/28/2024     1:17 PM   PHQ-9 SCORE   PHQ-9 Total Score MyChart 2 (Minimal depression)   PHQ-9 Total Score 2         8/28/2024   Substance Use   Alcohol more than 3/day or more than 7/wk No   Do you use any other substances recreationally? No        Social History     Tobacco Use    Smoking  "status: Never    Smokeless tobacco: Never    Tobacco comments:     no exposure   Vaping Use    Vaping status: Never Used   Substance Use Topics    Alcohol use: No     Alcohol/week: 0.0 standard drinks of alcohol    Drug use: No             8/28/2024   One time HIV Screening   Previous HIV test? I don't know          8/28/2024   STI Screening   New sexual partner(s) since last STI/HIV test? No        History of abnormal Pap smear: No - age 21-29 PAP every 3 years recommended             8/28/2024   Contraception/Family Planning   Questions about contraception or family planning No           Reviewed and updated as needed this visit by Provider                    Past Medical History:   Diagnosis Date    Mild intermittent asthma 10/16/2008     No past surgical history on file.  OB History   No obstetric history on file.         Review of Systems  Constitutional, HEENT, cardiovascular, pulmonary, GI, , musculoskeletal, neuro, skin, endocrine and psych systems are negative, except as otherwise noted.     Objective    Exam  /60 (BP Location: Right arm, Patient Position: Chair)   Pulse 72   Temp 97.9  F (36.6  C) (Temporal)   Resp 16   Ht 1.568 m (5' 1.75\")   Wt 51.1 kg (112 lb 11.2 oz)   LMP 08/09/2024   SpO2 99%   BMI 20.78 kg/m     Estimated body mass index is 20.78 kg/m  as calculated from the following:    Height as of this encounter: 1.568 m (5' 1.75\").    Weight as of this encounter: 51.1 kg (112 lb 11.2 oz).    Physical Exam  GENERAL: alert and no distress  EYES: Eyes grossly normal to inspection, PERRL and conjunctivae and sclerae normal  HENT: ear canals and TM's normal, nose and mouth without ulcers or lesions  NECK: no adenopathy, no asymmetry, masses, or scars  RESP: lungs clear to auscultation - no rales, rhonchi or wheezes  CV: regular rate and rhythm, normal S1 S2, no S3 or S4, no murmur, click or rub, no peripheral edema  ABDOMEN: soft, nontender, no hepatosplenomegaly, no masses and bowel " sounds normal  MS: no gross musculoskeletal defects noted, no edema  SKIN: left lower abdomen with small coin size mildly hypopigmented patch  NEURO: Normal strength and tone, mentation intact and speech normal  PSYCH: mentation appears normal, affect normal/bright        Signed Electronically by: Acosta Mcneil MD    Answers submitted by the patient for this visit:  Patient Health Questionnaire (Submitted on 8/28/2024)  If you checked off any problems, how difficult have these problems made it for you to do your work, take care of things at home, or get along with other people?: Not difficult at all  PHQ9 TOTAL SCORE: 2  Patient Health Questionnaire (G7) (Submitted on 8/28/2024)  ALETHA 7 TOTAL SCORE: 0

## 2024-09-03 ENCOUNTER — OFFICE VISIT (OUTPATIENT)
Dept: FAMILY MEDICINE | Facility: OTHER | Age: 19
End: 2024-09-03
Payer: COMMERCIAL

## 2024-09-03 VITALS
HEIGHT: 62 IN | HEART RATE: 87 BPM | RESPIRATION RATE: 16 BRPM | DIASTOLIC BLOOD PRESSURE: 62 MMHG | OXYGEN SATURATION: 97 % | SYSTOLIC BLOOD PRESSURE: 116 MMHG | BODY MASS INDEX: 20.61 KG/M2 | TEMPERATURE: 98.2 F | WEIGHT: 112 LBS

## 2024-09-03 DIAGNOSIS — N90.89 PERINEAL MASS IN FEMALE: Primary | ICD-10-CM

## 2024-09-03 PROCEDURE — 99213 OFFICE O/P EST LOW 20 MIN: CPT | Performed by: PHYSICIAN ASSISTANT

## 2024-09-03 NOTE — PROGRESS NOTES
"  Assessment & Plan     Perineal mass in female  - There is a mass in the region that is slightly tender to palpation.   - It is not on the labia and is not consistent with a gland. There were no associated inguinal lymphadenopathy.   - Suspect friction caused this to increase in size, she rides horses and was at a show all weekend.   - Recommended warm compresses  - Will reach out to OB/GYN to see if they would see patient versus general surgery.   - Right now appears well contained, does not have any overlying infection, she has no systemic symptoms.       Options for treatment and follow-up care were reviewed with the patient and/or guardian. Patient and/or guardian engaged in the decision making process and verbalized understanding of the options discussed and agreed with the final plan.      Yosef Bellamy is a 19 year old, presenting for the following health issues:  Cyst      9/3/2024     1:07 PM   Additional Questions   Roomed by Fartun ALFORD   Accompanied by self     History of Present Illness       Reason for visit:  Cyst  Symptom onset:  3-7 days ago  Symptoms include:  Mild pain  Symptom intensity:  Mild  Symptom progression:  Staying the same  Had these symptoms before:  No  What makes it worse:  No  What makes it better:  Ibuprofen She is missing 1 dose(s) of medications per week.     - Started on Thursday last week.   - Right sided.   - No drainage.   - Took ibuprofen. Helps a little bit.   - No abnormal vaginal bleeding. No dysuria, hematuria, urinary frequency. No abnormal vaginal discharge.       Review of Systems  Constitutional, gi and gu systems are negative, except as otherwise noted.      Objective    /62   Pulse 87   Temp 98.2  F (36.8  C) (Temporal)   Resp 16   Ht 1.568 m (5' 1.75\")   Wt 50.8 kg (112 lb)   LMP 08/09/2024   SpO2 97%   BMI 20.65 kg/m    Body mass index is 20.65 kg/m .  Physical Exam   GENERAL: alert and no distress   (female): normal female external " genitalia, normal urethral meatus , and right side perineal region there is a 1 in long x 3/4 in wide, firm, slightly mobile mass without overlying erythema, induration.  No inguinal adenopathy noted.   SKIN: no suspicious lesions or rashes            Signed Electronically by: Iam Wilson PA-C

## 2024-10-02 ENCOUNTER — MYC REFILL (OUTPATIENT)
Dept: FAMILY MEDICINE | Facility: CLINIC | Age: 19
End: 2024-10-02
Payer: COMMERCIAL

## 2024-10-02 DIAGNOSIS — J45.31 MILD PERSISTENT ASTHMA WITH ACUTE EXACERBATION: ICD-10-CM

## 2024-10-02 RX ORDER — ALBUTEROL SULFATE 90 UG/1
1-2 INHALANT RESPIRATORY (INHALATION) EVERY 4 HOURS PRN
Qty: 18 G | Refills: 4 | OUTPATIENT
Start: 2024-10-02

## 2024-10-09 ENCOUNTER — HOSPITAL ENCOUNTER (OUTPATIENT)
Dept: RESPIRATORY THERAPY | Facility: CLINIC | Age: 19
Discharge: HOME | End: 2024-10-09
Attending: ALLERGY & IMMUNOLOGY | Admitting: ALLERGY & IMMUNOLOGY
Payer: COMMERCIAL

## 2024-10-09 DIAGNOSIS — R06.00 DYSPNEA: Primary | ICD-10-CM

## 2024-10-09 PROCEDURE — 94726 PLETHYSMOGRAPHY LUNG VOLUMES: CPT

## 2024-10-09 PROCEDURE — 94729 DIFFUSING CAPACITY: CPT

## 2024-10-09 PROCEDURE — 94060 EVALUATION OF WHEEZING: CPT

## 2024-10-09 NOTE — DISCHARGE INSTRUCTIONS
Thank you for completing pulmonary function testing today.  All results will be scanned into your epic results for your doctor to review.  Please resume taking all your current prescribed medications and diet as directed by your provider.   If you have not heard from your provider about your testing within two weeks and do not have a follow-up appointment scheduled with them please contact your provider about any questions you have concerning your testing.   Thank you  [facility]

## 2024-10-10 LAB
DLCOCOR-%PRED-PRE: 126 %
DLCOCOR-PRE: 26.27 ML/MIN/MMHG
DLCOUNC-%PRED-PRE: 128 %
DLCOUNC-PRE: 26.74 ML/MIN/MMHG
DLCOUNC-PRED: 20.76 ML/MIN/MMHG
ERV-%PRED-PRE: 100 %
ERV-PRE: 1.17 L
ERV-PRED: 1.16 L
EXPTIME-PRE: 8.07 SEC
FEF2575-%PRED-POST: 78 %
FEF2575-%PRED-PRE: 19 %
FEF2575-POST: 2.76 L/SEC
FEF2575-PRE: 0.67 L/SEC
FEF2575-PRED: 3.52 L/SEC
FEFMAX-%PRED-PRE: 48 %
FEFMAX-PRE: 3.14 L/SEC
FEFMAX-PRED: 6.52 L/SEC
FEV1-%PRED-PRE: 54 %
FEV1-PRE: 1.56 L
FEV1FEV6-PRE: 55 %
FEV1FEV6-PRED: 87 %
FEV1FVC-PRE: 54 %
FEV1FVC-PRED: 89 %
FEV1SVC-PRE: 50 %
FEV1SVC-PRED: 81 %
FIFMAX-PRE: 1.88 L/SEC
FRCPLETH-%PRED-PRE: 128 %
FRCPLETH-PRE: 3.26 L
FRCPLETH-PRED: 2.54 L
FVC-%PRED-PRE: 90 %
FVC-PRE: 2.91 L
FVC-PRED: 3.23 L
IC-%PRED-PRE: 85 %
IC-PRE: 1.98 L
IC-PRED: 2.32 L
RVPLETH-%PRED-PRE: 169 %
RVPLETH-PRE: 2.1 L
RVPLETH-PRED: 1.24 L
TLCPLETH-%PRED-PRE: 114 %
TLCPLETH-PRE: 5.24 L
TLCPLETH-PRED: 4.56 L
VA-%PRED-PRE: 101 %
VA-PRE: 4.39 L
VC-%PRED-PRE: 88 %
VC-PRE: 3.14 L
VC-PRED: 3.54 L

## 2024-10-14 NOTE — RESULT ENCOUNTER NOTE
Bkam message sent:   Chidi Bellamy,    I've reviewed your breathing test results, and it suggests obstruction with a significant response to albuterol. I recommend you start using Symbicort, 2 puffs twice daily, on a regular basis. According to our records, you picked it up only once in August, which suggests there may have been a gap in use or a lower dose being used.    Best regards,  Ishmael Kuhn

## 2024-10-29 ENCOUNTER — OFFICE VISIT (OUTPATIENT)
Dept: ALLERGY | Facility: OTHER | Age: 19
End: 2024-10-29
Attending: ALLERGY & IMMUNOLOGY
Payer: COMMERCIAL

## 2024-10-29 VITALS
HEART RATE: 61 BPM | BODY MASS INDEX: 20.89 KG/M2 | WEIGHT: 113.32 LBS | OXYGEN SATURATION: 97 % | DIASTOLIC BLOOD PRESSURE: 68 MMHG | SYSTOLIC BLOOD PRESSURE: 106 MMHG

## 2024-10-29 DIAGNOSIS — J45.31 MILD PERSISTENT ASTHMA WITH ACUTE EXACERBATION: ICD-10-CM

## 2024-10-29 DIAGNOSIS — H10.13 ALLERGIC CONJUNCTIVITIS OF BOTH EYES: ICD-10-CM

## 2024-10-29 DIAGNOSIS — J30.1 SEASONAL ALLERGIC RHINITIS DUE TO POLLEN: ICD-10-CM

## 2024-10-29 DIAGNOSIS — J30.81 ALLERGIC RHINITIS DUE TO ANIMALS: ICD-10-CM

## 2024-10-29 DIAGNOSIS — J30.89 ALLERGIC RHINITIS DUE TO DUST MITE: ICD-10-CM

## 2024-10-29 DIAGNOSIS — J30.89 ALLERGIC RHINITIS CAUSED BY MOLD: ICD-10-CM

## 2024-10-29 DIAGNOSIS — J45.40 MODERATE PERSISTENT ASTHMA WITHOUT COMPLICATION: Primary | ICD-10-CM

## 2024-10-29 PROCEDURE — 99214 OFFICE O/P EST MOD 30 MIN: CPT | Performed by: ALLERGY & IMMUNOLOGY

## 2024-10-29 RX ORDER — FLUTICASONE FUROATE AND VILANTEROL TRIFENATATE 200; 25 UG/1; UG/1
1 POWDER RESPIRATORY (INHALATION) DAILY
Qty: 60 EACH | Refills: 4 | Status: SHIPPED | OUTPATIENT
Start: 2024-10-29

## 2024-10-29 RX ORDER — ALBUTEROL SULFATE 90 UG/1
2-4 INHALANT RESPIRATORY (INHALATION) EVERY 4 HOURS PRN
Qty: 18 G | Refills: 4 | Status: SHIPPED | OUTPATIENT
Start: 2024-10-29

## 2024-10-29 ASSESSMENT — ASTHMA QUESTIONNAIRES: ACT_TOTALSCORE: 13

## 2024-10-29 NOTE — PROGRESS NOTES
SUBJECTIVE:                                                                   Mia Rey presents today to our Allergy Clinic at [facility] for a follow up visit. She is a 19 year old female with allergic rhinitis, asthma, and history of food allergy (peanut, tree nut, fish, and egg).        Allergic rhinitis  History of allergic rhinitis symptoms since childhood.  In August 2024, positive serum IgE for cat, dog, horse dander, guinea pig, molds, grass pollen, tree pollen, and weed pollen.  Mild sensitivity to rabbit.    Asthma    History of asthma since childhood.  No previous history of hospitalizations.  Triggered by allergens, exertion, cold air, and respiratory infections.    Food allergy    History of urticaria and vomiting after peanut exposure.  Has been avoiding both peanuts and tree nuts.  History of throat itchiness after consuming coconut years ago.  In 2005, her peanut IgE was 18.9 KU/L, increasing to >100 KU/L by 2007.    Component      Latest Ref Rng 8/21/2024  7:53 AM   [place]afne H 1 Storage Protein Peanut      <0.10 KU(A)/L >100.00 (H)    Dafne H 2 Storage Protein Peanut      <0.10 KU(A)/L >100.00 (H)    Dafne H 3 Storage Protein Peanut      <0.10 KU(A)/L 45.70 (H)    Dafne H 6 Storage Protein Peanut      <0.10 KU(A)/L 97.90 (H)    Dafne H 8 ID-10 Protein      <0.10 KU(A)/L 3.77 (H)    [place] H 9 Lipid Transfer Protein      <0.10 KU(A)/L 2.61 (H)    Cor a 1 ID 10 Protein Hazelnut      <0.10 KU(A)/L 34.10 (H)    Cor a 8 Lipid Transfer Protein Hazelnut      <0.10 KU(A)/L 0.73 (H)    Cor a 9 Storage Protein 11S Hazelnut      <0.10 KU(A)/L 97.90 (H)    Cor a 14 Storage Protein 2S Hazelnut      <0.10 KU(A)/L 36.20 (H)    IGE      0 - 114 kU/L 2,948 (H)    Allergen [place]      <0.10 KU(A)/L >100.00 (H)    Allergen, [place] Nut      <0.10 KU(A)/L 64.50 (H)    Allergen [place] Nut rBer e 1 IgE      <0.10 KU(A)/L <0.10    Allergen Cashew      <0.10 KU(A)/L >100.00 (H)    Allergen Cashew  nut rAna o 3 IgE      <0.10 KU(A)/L 87.80 (H)    Allergen, Hazelnut      <0.10 KU(A)/L >100.00 (H)    Allergen Pecan      <0.10 KU(A)/L 56.30 (H)    Allergen Pistachio      <0.10 KU(A)/L >100.00 (H)    Allergen [place] rJug r 1 IgE      <0.10 KU(A)/L  90.20 (H)    Allergen [place] rJug r 3 IgE      <0.10 KU(A)/L 0.60 (H)    Allergen, [place]      <0.10 KU(A)/L 91.40 (H)    Allergen Coconut      <0.10 KU(A)/L >100.00 (H)    Allergen Peanut      <0.10 KU(A)/L >100.00 (H)       Legend:  (H) High      Itchy throat when consuming large amount of eggs. Her egg white IgE was 7.18 KU/L in 2005.    Component      Latest Ref [facility] 8/21/2024  7:53 AM   Allergen Ovalbumin (Gal D 2)      <0.10 KU(A)/L 0.22 (H)    Allergen Ovomucoid (Gal D 1)      <0.10 KU(A)/L 0.17 (H)    Allergen Egg White      <0.10 KU(A)/L 4.27 (H)       Legend:  (H) High      Itchy throat and throat tightness within minutes of ingestion of salmon on 2 occasions and 2023 or 2024.    Component      Latest Ref [facility] 8/21/2024  7:53 AM   Allergen Catfish      <0.10 KU(A)/L 27.00 (H)    Allergen Fish(Cod)      <0.10 KU(A)/L 25.50 (H)    Allergen Flounder      <0.10 KU(A)/L 13.40 (H)    Allergen Kristel IgE      <0.10 KU(A)/L 28.60 (H)    Allergen Halibut IgE      <0.10 KU(A)/L 20.10 (H)    Allergen, [place]      <0.10 KU(A)/L 22.40 (H)    Allergen, Trout      <0.10 KU(A)/L 22.60 (H)    Allergen Tuna      <0.10 KU(A)/L 6.59 (H)    Allergen [place] IgE      <0.10 KU(A)/L 46.10 (H)       Legend:  (H) High      Dermatitis  The patient has a history of eczema, primarily affecting her hands, and occasionally the inside of her elbows and the backs of her knees.       The patient underwent a pulmonary function test three weeks ago, revealing moderate airflow obstruction with a significant bronchodilator response. She was not on a controller medication at that time.    Currently, she uses her albuterol inhaler once daily and occasionally in the middle of the night, about  once weekly. She resumed daily Symbicort use two weeks ago after temporarily misplacing it and has been taking 2 puffs twice daily since then. She is uncertain if there has been noticeable symptom improvement since starting Symbicort.    She reports no recent use of prednisone and no ED, PCP, urgent care, or specialist visits for asthma exacerbations since her last visit.    She denies rhinoconjunctivitis symptoms and does not use allergy medications consistently. She has not needed azelastine nasal spray since her last appointment and reports that her allergic rhinitis symptoms are much better controlled than her asthma, with no issues of uncontrolled nasal congestion, rhinorrhea, or sneezing.    Additionally, she denies any issues with dermatitis since the last visit.    Denies any accidental exposures to peanuts, tree nuts, fish, or egg.    Patient Active Problem List   Diagnosis    Peanut allergy    Tibial plateau fracture, left, closed, initial encounter    Mild major depression (H)    ALETHA (generalized anxiety disorder)    Mild persistent asthma with acute exacerbation    Trouble in sleeping    Deliberate self-cutting       Past Medical History:   Diagnosis Date    Mild intermittent asthma 10/16/2008      Problem (# of Occurrences) Relation (Name,Age of Onset)    Cancer (1) Paternal Grandmother: lung cancer    Diabetes (1) Maternal Grandfather    Eye Disorder (1) Maternal Grandmother    Respiratory (1) Paternal Grandmother: lung cancer-smoker          No past surgical history on file.  Social History     Socioeconomic History    Marital status: Single     Spouse name: None    Number of children: None    Years of education: None    Highest education level: None   Tobacco Use    Smoking status: Never    Smokeless tobacco: Never    Tobacco comments:     no exposure   Vaping Use    Vaping status: Never Used   Substance and Sexual Activity    Alcohol use: No     Alcohol/week: 0.0 standard drinks of alcohol    Drug  use: No    Sexual activity: Yes     Partners: Male     Birth control/protection: Condom, Pill   Social History Narrative    October 29, 2024            ENVIRONMENTAL HISTORY: [place] The [place] is heated with a radiant heat. They does not have central air conditioning. The patient's bedroom is furnished with none.  Pets inside the house include 2 cat(s) and 3 dog(s). There is no history of cockroach or mice infestation. There is/are 1 smokers in the house.  The house does have a damp basement.      Social Drivers of Health     Financial Resource Strain: Low Risk  (8/28/2024)    Financial Resource Strain     Within the past 12 months, have you or your family members you live with been unable to get utilities (heat, electricity) when it was really needed?: No   Food Insecurity: Low Risk  (8/28/2024)    Food Insecurity     Within the past 12 months, did you worry that your food would run out before you got money to buy more?: No     Within the past 12 months, did the food you bought just not last and you didn t have money to get more?: No   Transportation Needs: Low Risk  (8/28/2024)    Transportation Needs     Within the past 12 months, has lack of transportation kept you from medical appointments, getting your medicines, non-medical meetings or appointments, work, or from getting things that you need?: No   Physical Activity: Sufficiently Active (8/28/2024)    Exercise Vital Sign     Days of Exercise per Week: 4 days     Minutes of Exercise per Session: 40 min   Stress: No Stress Concern Present (8/28/2024)    [employer] - Occupational Stress Questionnaire     Feeling of Stress : Only a little   Social Connections: Unknown (8/28/2024)    Social Connection and Isolation Panel [NHANES]     Frequency of Social Gatherings with Friends and Family: Twice a week   Interpersonal Safety: Low Risk  (9/3/2024)    Interpersonal Safety     Do you feel  physically and emotionally safe where you currently live?: Yes     Within the past 12 months, have you been hit, slapped, kicked or otherwise physically hurt by someone?: No     Within the past 12 months, have you been humiliated or emotionally abused in other ways by your partner or ex-partner?: No   Housing Stability: Low Risk  (8/28/2024)    Housing Stability     Do you have housing? : Yes     Are you worried about losing your housing?: No             Current Outpatient Medications:     albuterol (PROAIR HFA/PROVENTIL HFA/VENTOLIN HFA) 108 (90 Base) MCG/ACT inhaler, Inhale 2-4 puffs into the lungs every 4 hours as needed for shortness of breath, wheezing or cough., Disp: 18 g, Rfl: 4    azelastine (ASTELIN) 0.1 % nasal spray, Spray 2 sprays into both nostrils 2 times daily as needed for rhinitis., Disp: 30 mL, Rfl: 3    BREO ELLIPTA 200-25 MCG/ACT inhaler, Inhale 1 puff into the lungs daily., Disp: 60 each, Rfl: 4    budesonide-formoterol (SYMBICORT) 80-4.5 MCG/ACT Inhaler, Inhale 2 puffs into the lungs 2 times daily., Disp: 10.2 g, Rfl: 4    EPINEPHrine (ANY BX GENERIC EQUIV) 0.3 MG/0.3ML injection 2-pack, INJECT THE CONTENTS OF 1 SYRINGE (0.3ML) INTO THE MUSCLE ONCE AS NEEDED FOR ANAPHYLAXIS, Disp: 1 mL, Rfl: 2    fluticasone (CUTIVATE) 0.05 % external cream, Apply topically 2 times daily, Disp: 30 g, Rfl: 1    spacer (OPTICHAMBER CECILIO) holding chamber, Use with inhaler, Disp: 1 each, Rfl: 0    Prenatal Vit-Fe Fumarate-FA (PRENATAL MULTIVITAMIN W/IRON) 27-0.8 MG tablet, Take 1 tablet by mouth daily (Patient not taking: Reported on 10/29/2024), Disp: 90 tablet, Rfl: 3    triamcinolone (KENALOG) 0.1 % external ointment, Apply sparingly to affected area twice daily as needed not longer than 14 days in a row. Do not apply on face, neck, armpits and groin area. (Patient not taking: Reported on 10/29/2024), Disp: 80 g, Rfl: 3  Immunization History   Administered Date(s) Administered    Comvax (HIB/HepB)  2003, 2003, 03/30/2004    DTAP (<7y) 2003, 2003, 2003, 06/29/2004, 06/11/2008    HEPA 06/19/2007, 06/11/2008    HPV 05/01/2015, 07/17/2015, 01/27/2016    Influenza (H1N1) 11/16/2009    Influenza (IIV3) PF 2003, 11/14/2006, 10/15/2007, 10/16/2008, 10/11/2010, 11/04/2011, 12/27/2012    Influenza Vaccine >6 months,quad, PF 11/08/2013, 10/22/2014, 10/14/2015    MMR 03/30/2004, 06/11/2008    Meningococcal ACWY (Menactra ) 05/01/2015    Pneumococcal (PCV 7) 2003, 2003, 2003    Poliovirus, inactivated (IPV) 2003, 2003, 2003, 06/11/2008    TDAP Vaccine (Boostrix) 05/01/2015    Varicella 06/29/2004, 06/11/2008     Allergies   Allergen Reactions    Peanuts [Nuts] Rash     OBJECTIVE:                                                                 /68 (BP Location: Right arm, Patient Position: Sitting, Cuff Size: Adult Regular)   Pulse 61   Wt 51.4 kg (113 lb 5.1 oz)   SpO2 97%   BMI 20.89 kg/m          Physical Exam  Vitals and nursing note reviewed.   Constitutional:       General: She is not in acute distress.     Appearance: She is not ill-appearing, toxic-appearing or diaphoretic.   HENT:      Head: Normocephalic and atraumatic.      Right Ear: Tympanic membrane, ear canal and external ear normal.      Left Ear: Tympanic membrane, ear canal and external ear normal.      Nose: Septal deviation (rightward) present. No mucosal edema, congestion or rhinorrhea.      Right Turbinates: Enlarged. Not swollen.      Left Turbinates: Enlarged. Not swollen.      Comments: Transverse nasal crease noted      Mouth/Throat:      Lips: Pink.      Mouth: Mucous membranes are moist.      Pharynx: Oropharynx is clear. No pharyngeal swelling, oropharyngeal exudate, posterior oropharyngeal erythema or uvula swelling.   Eyes:      General:         Right eye: No discharge.         Left eye: No discharge.      Conjunctiva/sclera: Conjunctivae normal.    Cardiovascular:      Rate and Rhythm: Normal rate and regular rhythm.      Heart sounds: Normal heart sounds. No murmur heard.  Pulmonary:      Effort: Pulmonary effort is normal. No prolonged expiration or respiratory distress.      Breath sounds: Normal breath sounds. No stridor, decreased air movement or transmitted upper airway sounds. No decreased breath sounds, wheezing, rhonchi or rales.   Skin:     Findings: Rash present.      Comments: Small xerotic patches in the antecubital fossa bilaterally   Neurological:      Mental Status: She is alert and oriented to person, place, and time.   Psychiatric:         Mood and Affect: Mood normal.         Behavior: Behavior normal.                    WORKUP: ACT 13         ASSESSMENT/PLAN:         Moderate persistent asthma without complication  Symptoms symptoms well-controlled.  - Stop Symbicort.  -Start Breo 200/25 mcg 1 puff once daily.  - Continue using albuterol inhaler 2 to 4 puffs every 4 hours as needed for persistent cough/chest tightness/wheezing/shortness of breath.  -The patient declined pneumococcal and influenza vaccination.    - BREO ELLIPTA 200-25 MCG/ACT inhaler  Dispense: 60 each; Refill: 4  - albuterol (PROAIR HFA/PROVENTIL HFA/VENTOLIN HFA) 108 (90 Base) MCG/ACT inhaler  Dispense: 18 g; Refill: 4    Allergic rhinitis  Currently well-controlled without daily oral antihistamine or azelastine nasal spray.  - Continue monitoring symptoms.  - Take loratadine or cetirizine 10 mg by mouth once daily as needed.  - Use azelastine 2 sprays in each nostril twice daily as needed.    Follow-up in 2 months or sooner if needed.    Thank you for allowing us to participate in the care of this patient. Please feel free to contact us if there are any questions or concerns about the patient.    Disclaimer: This note consists of symbols derived from keyboarding, dictation and/or voice recognition software. As a result, there may be errors in the script that have gone  undetected. Please consider this when interpreting information found in this chart.    Ishmael Kuhn MD, FAAAAI, FACAAI  Allergy, Asthma and Immunology     [facility]

## 2024-10-29 NOTE — PATIENT INSTRUCTIONS
Dr Kuhn [phone]    All visits for food challenges, venom allergy testing, and medication/drug challenges MUST be scheduled through the allergy clinic nurse. Please send a [id number] message or call the allergy scheduling line and ask to speak with Dr Kuhn's team for scheduling these appointments. Appointments for these visits that are made through the schedulers or via [employer] may be cancelled or rescheduled.    Allergy Shot Room ([place]): [phone]    Pulmonary Function [phone]    Prescription Assistance  If you need assistance with your prescriptions (cost, coverage, etc) please contact: [place] [facility] [phone]

## 2024-10-29 NOTE — LETTER
10/29/2024      Mia Rey  [address] [facility] [address]      Dear Colleague,    Thank you for referring your patient, Mia Rey, to the [facility]. Please see a copy of my visit note below.    SUBJECTIVE:                                                                   Mia Rey presents today to our Allergy Clinic at [facility] for a follow up visit. She is a 19 year old female with allergic rhinitis, asthma, and history of food allergy (peanut, tree nut, fish, and egg).        Allergic rhinitis  History of allergic rhinitis symptoms since childhood.  In August 2024, positive serum IgE for cat, dog, horse dander, guinea pig, molds, grass pollen, tree pollen, and weed pollen.  Mild sensitivity to rabbit.    Asthma    History of asthma since childhood.  No previous history of hospitalizations.  Triggered by allergens, exertion, cold air, and respiratory infections.    Food allergy    History of urticaria and vomiting after peanut exposure.  Has been avoiding both peanuts and tree nuts.  History of throat itchiness after consuming coconut years ago.  In 2005, her peanut IgE was 18.9 KU/L, increasing to >100 KU/L by 2007.    Component      Latest Ref Rn 8/21/2024  7:53 AM   Dafne H 1 Storage Protein Peanut      <0.10 KU(A)/L >100.00 (H)    Dafne H 2 Storage Protein Peanut      <0.10 KU(A)/L >100.00 (H)    Dafne H 3 Storage Protein Peanut      <0.10 KU(A)/L 45.70 (H)    Dafne H 6 Storage Protein Peanut      <0.10 KU(A)/L 97.90 (H)    Dafne H 8 NJ-10 Protein      <0.10 KU(A)/L 3.77 (H)    Dafne H 9 Lipid Transfer Protein      <0.10 KU(A)/L 2.61 (H)    Cor a 1 NJ 10 Protein Hazelnut      <0.10 KU(A)/L 34.10 (H)    Cor a 8 Lipid Transfer Protein Hazelnut      <0.10 KU(A)/L 0.73 (H)    Cor a 9 Storage Protein 11S Hazelnut      <0.10 KU(A)/L 97.90 (H)    Cor a 14 Storage Protein 2S Hazelnut      <0.10 KU(A)/L 36.20 (H)    IGE      0 - 114  kU/L 2,948 (H)    Allergen Flanagan      <0.10 KU(A)/L >100.00 (H)    Allergen, [place] Nut      <0.10 KU(A)/L 64.50 (H)    Allergen [place] Nut rBer e 1 IgE      <0.10 KU(A)/L <0.10    Allergen Cashew      <0.10 KU(A)/L >100.00 (H)    Allergen Cashew nut rAna o 3 IgE      <0.10 KU(A)/L 87.80 (H)    Allergen, Hazelnut      <0.10 KU(A)/L >100.00 (H)    Allergen Pecan      <0.10 KU(A)/L 56.30 (H)    Allergen Pistachio      <0.10 KU(A)/L >100.00 (H)    Allergen [place] rJug r 1 IgE      <0.10 KU(A)/L  90.20 (H)    Allergen [place] rJug r 3 IgE      <0.10 KU(A)/L 0.60 (H)    Allergen, [place]      <0.10 KU(A)/L 91.40 (H)    Allergen Coconut      <0.10 KU(A)/L >100.00 (H)    Allergen Peanut      <0.10 KU(A)/L >100.00 (H)       Legend:  (H) High      Itchy throat when consuming large amount of eggs. Her egg white IgE was 7.18 KU/L in 2005.    Component      Latest Ref [facility] 8/21/2024  7:53 AM   Allergen Ovalbumin (Gal D 2)      <0.10 KU(A)/L 0.22 (H)    Allergen Ovomucoid (Gal D 1)      <0.10 KU(A)/L 0.17 (H)    Allergen Egg White      <0.10 KU(A)/L 4.27 (H)       Legend:  (H) High      Itchy throat and throat tightness within minutes of ingestion of salmon on 2 occasions and 2023 or 2024.    Component      Latest Ref [facility] 8/21/2024  7:53 AM   Allergen Catfish      <0.10 KU(A)/L 27.00 (H)    Allergen Fish(Cod)      <0.10 KU(A)/L 25.50 (H)    Allergen Flounder      <0.10 KU(A)/L 13.40 (H)    Allergen Kristel IgE      <0.10 KU(A)/L 28.60 (H)    Allergen Halibut IgE      <0.10 KU(A)/L 20.10 (H)    Allergen, [place]      <0.10 KU(A)/L 22.40 (H)    Allergen, Trout      <0.10 KU(A)/L 22.60 (H)    Allergen Tuna      <0.10 KU(A)/L 6.59 (H)    Allergen [place] IgE      <0.10 KU(A)/L 46.10 (H)       Legend:  (H) High      Dermatitis  The patient has a history of eczema, primarily affecting her hands, and occasionally the inside of her elbows and the backs of her knees.       The patient underwent a pulmonary function test three weeks  ago, revealing moderate airflow obstruction with a significant bronchodilator response. She was not on a controller medication at that time.    Currently, she uses her albuterol inhaler once daily and occasionally in the middle of the night, about once weekly. She resumed daily Symbicort use two weeks ago after temporarily misplacing it and has been taking 2 puffs twice daily since then. She is uncertain if there has been noticeable symptom improvement since starting Symbicort.    She reports no recent use of prednisone and no ED, PCP, urgent care, or specialist visits for asthma exacerbations since her last visit.    She denies rhinoconjunctivitis symptoms and does not use allergy medications consistently. She has not needed azelastine nasal spray since her last appointment and reports that her allergic rhinitis symptoms are much better controlled than her asthma, with no issues of uncontrolled nasal congestion, rhinorrhea, or sneezing.    Additionally, she denies any issues with dermatitis since the last visit.    Denies any accidental exposures to peanuts, tree nuts, fish, or egg.    Patient Active Problem List   Diagnosis     Peanut allergy     Tibial plateau fracture, left, closed, initial encounter     Mild major depression (H)     ALETHA (generalized anxiety disorder)     Mild persistent asthma with acute exacerbation     Trouble in sleeping     Deliberate self-cutting       Past Medical History:   Diagnosis Date     Mild intermittent asthma 10/16/2008      Problem (# of Occurrences) Relation (Name,Age of Onset)    Cancer (1) Paternal Grandmother: lung cancer    Diabetes (1) Maternal Grandfather    Eye Disorder (1) Maternal Grandmother    Respiratory (1) Paternal Grandmother: lung cancer-smoker          No past surgical history on file.  Social History     Socioeconomic History     Marital status: Single     Spouse name: None     Number of children: None     Years of education: None     Highest education level:  None   Tobacco Use     Smoking status: Never     Smokeless tobacco: Never     Tobacco comments:     no exposure   Vaping Use     Vaping status: Never Used   Substance and Sexual Activity     Alcohol use: No     Alcohol/week: 0.0 standard drinks of alcohol     Drug use: No     Sexual activity: Yes     Partners: Male     Birth control/protection: Condom, Pill   Social History Narrative    October 29, 2024            ENVIRONMENTAL HISTORY: [place] The home is heated with a radiant heat. They does not have central air conditioning. The patient's bedroom is furnished with none.  Pets inside the house include 2 cat(s) and 3 dog(s). There is no history of cockroach or mice infestation. There is/are 1 smokers in the house.  The house does have a damp basement.      Social Drivers of Health     Financial Resource Strain: Low Risk  (8/28/2024)    Financial Resource Strain      Within the past 12 months, have you or your family members you live with been unable to get utilities (heat, electricity) when it was really needed?: No   Food Insecurity: Low Risk  (8/28/2024)    Food Insecurity      Within the past 12 months, did you worry that your food would run out before you got money to buy more?: No      Within the past 12 months, did the food you bought just not last and you didn t have money to get more?: No   Transportation Needs: Low Risk  (8/28/2024)    Transportation Needs      Within the past 12 months, has lack of transportation kept you from medical appointments, getting your medicines, non-medical meetings or appointments, work, or from getting things that you need?: No   Physical Activity: Sufficiently Active (8/28/2024)    Exercise Vital Sign      Days of Exercise per Week: 4 days      Minutes of Exercise per Session: 40 min   Stress: No Stress Concern Present (8/28/2024)    [place] [employer] - Occupational Stress Questionnaire      Feeling of Stress  : Only a little   Social Connections: Unknown (8/28/2024)    Social Connection and Isolation Panel [NHANES]      Frequency of Social Gatherings with Friends and Family: Twice a week   Interpersonal Safety: Low Risk  (9/3/2024)    Interpersonal Safety      Do you feel physically and emotionally safe where you currently live?: Yes      Within the past 12 months, have you been hit, slapped, kicked or otherwise physically hurt by someone?: No      Within the past 12 months, have you been humiliated or emotionally abused in other ways by your partner or ex-partner?: No   Housing Stability: Low Risk  (8/28/2024)    Housing Stability      Do you have housing? : Yes      Are you worried about losing your housing?: No             Current Outpatient Medications:      albuterol (PROAIR HFA/PROVENTIL HFA/VENTOLIN HFA) 108 (90 Base) MCG/ACT inhaler, Inhale 2-4 puffs into the lungs every 4 hours as needed for shortness of breath, wheezing or cough., Disp: 18 g, Rfl: 4     azelastine (ASTELIN) 0.1 % nasal spray, Spray 2 sprays into both nostrils 2 times daily as needed for rhinitis., Disp: 30 mL, Rfl: 3     BREO ELLIPTA 200-25 MCG/ACT inhaler, Inhale 1 puff into the lungs daily., Disp: 60 each, Rfl: 4     budesonide-formoterol (SYMBICORT) 80-4.5 MCG/ACT Inhaler, Inhale 2 puffs into the lungs 2 times daily., Disp: 10.2 g, Rfl: 4     EPINEPHrine (ANY BX GENERIC EQUIV) 0.3 MG/0.3ML injection 2-pack, INJECT THE CONTENTS OF 1 SYRINGE (0.3ML) INTO THE MUSCLE ONCE AS NEEDED FOR ANAPHYLAXIS, Disp: 1 mL, Rfl: 2     fluticasone (CUTIVATE) 0.05 % external cream, Apply topically 2 times daily, Disp: 30 g, Rfl: 1     spacer (OPTICHAMBER CECILIO) holding chamber, Use with inhaler, Disp: 1 each, Rfl: 0     Prenatal Vit-Fe Fumarate-FA (PRENATAL MULTIVITAMIN W/IRON) 27-0.8 MG tablet, Take 1 tablet by mouth daily (Patient not taking: Reported on 10/29/2024), Disp: 90 tablet, Rfl: 3     triamcinolone (KENALOG) 0.1 % external ointment, Apply  sparingly to affected area twice daily as needed not longer than 14 days in a row. Do not apply on face, neck, armpits and groin area. (Patient not taking: Reported on 10/29/2024), Disp: 80 g, Rfl: 3  Immunization History   Administered Date(s) Administered     Comvax (HIB/HepB) 2003, 2003, 03/30/2004     DTAP (<7y) 2003, 2003, 2003, 06/29/2004, 06/11/2008     HEPA 06/19/2007, 06/11/2008     HPV 05/01/2015, 07/17/2015, 01/27/2016     Influenza (H1N1) 11/16/2009     Influenza (IIV3) PF 2003, 11/14/2006, 10/15/2007, 10/16/2008, 10/11/2010, 11/04/2011, 12/27/2012     Influenza Vaccine >6 months,quad, PF 11/08/2013, 10/22/2014, 10/14/2015     MMR 03/30/2004, 06/11/2008     Meningococcal ACWY (Menactra ) 05/01/2015     Pneumococcal (PCV 7) 2003, 2003, 2003     Poliovirus, inactivated (IPV) 2003, 2003, 2003, 06/11/2008     TDAP Vaccine (Boostrix) 05/01/2015     Varicella 06/29/2004, 06/11/2008     Allergies   Allergen Reactions     Peanuts [Nuts] Rash     OBJECTIVE:                                                                 /68 (BP Location: Right arm, Patient Position: Sitting, Cuff Size: Adult Regular)   Pulse 61   Wt 51.4 kg (113 lb 5.1 oz)   SpO2 97%   BMI 20.89 kg/m          Physical Exam  Vitals and nursing note reviewed.   Constitutional:       General: She is not in acute distress.     Appearance: She is not ill-appearing, toxic-appearing or diaphoretic.   HENT:      Head: Normocephalic and atraumatic.      Right Ear: Tympanic membrane, ear canal and external ear normal.      Left Ear: Tympanic membrane, ear canal and external ear normal.      Nose: Septal deviation (rightward) present. No mucosal edema, congestion or rhinorrhea.      Right Turbinates: Enlarged. Not swollen.      Left Turbinates: Enlarged. Not swollen.      Comments: Transverse nasal crease noted      Mouth/Throat:      Lips: Pink.      Mouth: Mucous membranes  are moist.      Pharynx: Oropharynx is clear. No pharyngeal swelling, oropharyngeal exudate, posterior oropharyngeal erythema or uvula swelling.   Eyes:      General:         Right eye: No discharge.         Left eye: No discharge.      Conjunctiva/sclera: Conjunctivae normal.   Cardiovascular:      Rate and Rhythm: Normal rate and regular rhythm.      Heart sounds: Normal heart sounds. No murmur heard.  Pulmonary:      Effort: Pulmonary effort is normal. No prolonged expiration or respiratory distress.      Breath sounds: Normal breath sounds. No stridor, decreased air movement or transmitted upper airway sounds. No decreased breath sounds, wheezing, rhonchi or rales.   Skin:     Findings: Rash present.      Comments: Small xerotic patches in the antecubital fossa bilaterally   Neurological:      Mental Status: She is alert and oriented to person, place, and time.   Psychiatric:         Mood and Affect: Mood normal.         Behavior: Behavior normal.                    WORKUP: ACT 13         ASSESSMENT/PLAN:         Moderate persistent asthma without complication  Symptoms symptoms well-controlled.  - Stop Symbicort.  -Start Breo 200/25 mcg 1 puff once daily.  - Continue using albuterol inhaler 2 to 4 puffs every 4 hours as needed for persistent cough/chest tightness/wheezing/shortness of breath.      - BREO ELLIPTA 200-25 MCG/ACT inhaler  Dispense: 60 each; Refill: 4  - albuterol (PROAIR HFA/PROVENTIL HFA/VENTOLIN HFA) 108 (90 Base) MCG/ACT inhaler  Dispense: 18 g; Refill: 4    Allergic rhinitis  Currently well-controlled without daily oral antihistamine or azelastine nasal spray.  - Continue monitoring symptoms.  - Take loratadine or cetirizine 10 mg by mouth once daily as needed.  - Use azelastine 2 sprays in each nostril twice daily as needed.    Follow-up in 2 months or sooner if needed.    Thank you for allowing us to participate in the care of this patient. Please feel free to contact us if there are any  questions or concerns about the patient.    Disclaimer: This note consists of symbols derived from keyboarding, dictation and/or voice recognition software. As a result, there may be errors in the script that have gone undetected. Please consider this when interpreting information found in this chart.    Ishmael Kuhn MD, FAAAAI, FACAAI  Allergy, Asthma and Immunology     [facility]        Again, thank you for allowing me to participate in the care of your patient.        Sincerely,        Ishmael Kuhn MD

## 2025-06-24 ENCOUNTER — TELEPHONE (OUTPATIENT)
Dept: PODIATRY | Facility: CLINIC | Age: 20
End: 2025-06-24
Payer: COMMERCIAL

## 2025-06-24 ENCOUNTER — TRANSFERRED RECORDS (OUTPATIENT)
Dept: HEALTH INFORMATION MANAGEMENT | Facility: CLINIC | Age: 20
End: 2025-06-24
Payer: COMMERCIAL

## 2025-06-24 NOTE — TELEPHONE ENCOUNTER
Patient scheduled for the next available which is 7/1/25, patient was added to the wait list, she is calling to get a disc of her imaging and will bring it to her appointment.

## 2025-06-24 NOTE — TELEPHONE ENCOUNTER
Referral     Received from: [facility]     Referral Issue Date: 6/24/25    Patient has been referred to see orthopedics by Anali Kim PA-C for sprain of unspecified ligament of right ankle and fracture of unspecified metatarsal bone, right foot, closed fracture. Note advised that patient follow-up within three days, however, ok to schedule for next available with Dr. Briseno. Please assist patient in scheduling. When calling patient, please confirm that a disc with images from her x-ray has been/will be obtained prior to appointment. If this is not available, x-rays will need to be repeated.     A copy of the referral was sent to scanning and the original was placed in Dr. Briseno's basket.     Dipti Haywood RN   [facility]

## 2025-07-01 ENCOUNTER — ANCILLARY PROCEDURE (OUTPATIENT)
Dept: GENERAL RADIOLOGY | Facility: CLINIC | Age: 20
End: 2025-07-01
Attending: PODIATRIST
Payer: COMMERCIAL

## 2025-07-01 ENCOUNTER — OFFICE VISIT (OUTPATIENT)
Dept: PODIATRY | Facility: CLINIC | Age: 20
End: 2025-07-01
Payer: COMMERCIAL

## 2025-07-01 VITALS — HEIGHT: 62 IN | BODY MASS INDEX: 22.08 KG/M2 | WEIGHT: 120 LBS

## 2025-07-01 DIAGNOSIS — S92.354A CLOSED NONDISPLACED FRACTURE OF FIFTH METATARSAL BONE OF RIGHT FOOT, INITIAL ENCOUNTER: Primary | ICD-10-CM

## 2025-07-01 PROCEDURE — 99203 OFFICE O/P NEW LOW 30 MIN: CPT | Performed by: PODIATRIST

## 2025-07-01 PROCEDURE — 1125F AMNT PAIN NOTED PAIN PRSNT: CPT | Performed by: PODIATRIST

## 2025-07-01 ASSESSMENT — PAIN SCALES - GENERAL: PAINLEVEL_OUTOF10: MILD PAIN (1)

## 2025-07-01 NOTE — LETTER
7/1/2025      Mia Rey  [address] [facility] [address]      Dear Colleague,    Thank you for referring your patient, Mia Rey, to the [facility]. Please see a copy of my visit note below.    HPI:  Mia Rey is a 20 year old female who is seen in consultation at the request of [facility].    Pt presents for eval of:   (Onset, Location, L/R, Character, Treatments, Injury if yes)    XR Right ankle 6/24/2025, images pushed to PACS from referring clinic     Onset 6/21/2025, while doing a back flip, landed on Right foot/ ankle side ways. Could ambulate with minimal weight. Presents with lateral Right foot and ankle pain, WB w/tall gray fx boot.   Constant swelling, bruising, dull ache pain 1-4/10.     Started wearing boot DOI and only for daily activity.  Ace wrap, rest, elevation.     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    ROS:  10 point ROS neg other than the symptoms noted above in the HPI.    Patient Active Problem List   Diagnosis     Peanut allergy     Tibial plateau fracture, left, closed, initial encounter     Mild major depression (H)     ALETHA (generalized anxiety disorder)     Mild persistent asthma with acute exacerbation     Trouble in sleeping     Deliberate self-cutting       PAST MEDICAL HISTORY:   Past Medical History:   Diagnosis Date     Mild intermittent asthma 10/16/2008        PAST SURGICAL HISTORY: History reviewed. No pertinent surgical history.     MEDICATIONS:   Current Outpatient Medications:      albuterol (PROAIR HFA/PROVENTIL HFA/VENTOLIN HFA) 108 (90 Base) MCG/ACT inhaler, Inhale 2-4 puffs into the lungs every 4 hours as needed for shortness of breath, wheezing or cough. Needs an appointment for further refills., Disp: 18 g, Rfl: 0     BREO ELLIPTA 200-25 MCG/ACT inhaler, Inhale 1 puff into the lungs daily., Disp: 60 each, Rfl: 4     budesonide-formoterol (SYMBICORT) 80-4.5 MCG/ACT Inhaler, Inhale 2 puffs into  the lungs 2 times daily., Disp: 10.2 g, Rfl: 4     EPINEPHrine (ANY BX GENERIC EQUIV) 0.3 MG/0.3ML injection 2-pack, INJECT THE CONTENTS OF 1 SYRINGE (0.3ML) INTO THE MUSCLE ONCE AS NEEDED FOR ANAPHYLAXIS, Disp: 1 mL, Rfl: 2     fluticasone (CUTIVATE) 0.05 % external cream, Apply topically 2 times daily, Disp: 30 g, Rfl: 1     spacer (OPTICHAMBER CECILIO) holding chamber, Use with inhaler, Disp: 1 each, Rfl: 0     azelastine (ASTELIN) 0.1 % nasal spray, Spray 2 sprays into both nostrils 2 times daily as needed for rhinitis. (Patient not taking: Reported on 7/1/2025), Disp: 30 mL, Rfl: 3     Prenatal Vit-Fe Fumarate-FA (PRENATAL MULTIVITAMIN W/IRON) 27-0.8 MG tablet, Take 1 tablet by mouth daily (Patient not taking: Reported on 7/1/2025), Disp: 90 tablet, Rfl: 3     triamcinolone (KENALOG) 0.1 % external ointment, Apply sparingly to affected area twice daily as needed not longer than 14 days in a row. Do not apply on face, neck, armpits and groin area. (Patient not taking: Reported on 7/1/2025), Disp: 80 g, Rfl: 3     ALLERGIES:    Allergies   Allergen Reactions     Peanuts [Nuts] Rash        SOCIAL HISTORY:   Social History     Socioeconomic History     Marital status: Single     Spouse name: Not on file     Number of children: Not on file     Years of education: Not on file     Highest education level: Not on file   Occupational History     Not on file   Tobacco Use     Smoking status: Every Day     Smokeless tobacco: Never     Tobacco comments:     7/1/2025 patient reports vaping     no exposure   Vaping Use     Vaping status: Never Used   Substance and Sexual Activity     Alcohol use: No     Alcohol/week: 0.0 standard drinks of alcohol     Drug use: No     Sexual activity: Yes     Partners: Male     Birth control/protection: Condom, Pill   Other Topics Concern     Not on file   Social History Narrative    October 29, 2024            ENVIRONMENTAL HISTORY: [place]  home is heated with a radiant heat. They does not have central air conditioning. The patient's bedroom is furnished with none.  Pets inside the house include 2 cat(s) and 3 dog(s). There is no history of cockroach or mice infestation. There is/are 1 smokers in the house.  The house does have a damp basement.      Social Drivers of Health     Financial Resource Strain: Low Risk  (8/28/2024)    Financial Resource Strain      Within the past 12 months, have you or your family members you live with been unable to get utilities (heat, electricity) when it was really needed?: No   Food Insecurity: Low Risk  (8/28/2024)    Food Insecurity      Within the past 12 months, did you worry that your food would run out before you got money to buy more?: No      Within the past 12 months, did the food you bought just not last and you didn t have money to get more?: No   Transportation Needs: Low Risk  (8/28/2024)    Transportation Needs      Within the past 12 months, has lack of transportation kept you from medical appointments, getting your medicines, non-medical meetings or appointments, work, or from getting things that you need?: No   Physical Activity: Sufficiently Active (8/28/2024)    Exercise Vital Sign      Days of Exercise per Week: 4 days      Minutes of Exercise per Session: 40 min   Stress: No Stress Concern Present (8/28/2024)    [employer] - Occupational Stress Questionnaire      Feeling of Stress : Only a little   Social Connections: Unknown (8/28/2024)    Social Connection and Isolation Panel [NHANES]      Frequency of Communication with Friends and Family: Not on file      Frequency of Social Gatherings with Friends and Family: Twice a week      Attends Hoahaoism Services: Not on file      Active Member of Clubs or Organizations: Not on file      Attends Club or Organization Meetings: Not on file      Marital Status: Not on file   Interpersonal Safety: Low Risk  (9/3/2024)     "Interpersonal Safety      Do you feel physically and emotionally safe where you currently live?: Yes      Within the past 12 months, have you been hit, slapped, kicked or otherwise physically hurt by someone?: No      Within the past 12 months, have you been humiliated or emotionally abused in other ways by your partner or ex-partner?: No   Housing Stability: Low Risk  (8/28/2024)    Housing Stability      Do you have housing? : Yes      Are you worried about losing your housing?: No        FAMILY HISTORY:   Family History   Problem Relation Age of Onset     Diabetes Maternal Grandfather      Respiratory Paternal Grandmother         lung cancer-smoker     Cancer Paternal Grandmother         lung cancer     Eye Disorder Maternal Grandmother         EXAM:Vitals: Ht 1.568 m (5' 1.75\")   Wt 54.4 kg (120 lb)   BMI 22.13 kg/m    BMI= Body mass index is 22.13 kg/m .    General appearance: Patient is alert and fully cooperative with history & exam.  No sign of distress is noted during the visit.     Psychiatric: Affect is pleasant & appropriate.  Patient appears motivated to improve health.     Respiratory: Breathing is regular & unlabored while sitting.     HEENT: Hearing is intact to spoken word.  Speech is clear.  No gross evidence of visual impairment that would impact ambulation.     Vascular: DP & PT pulses are intact & regular bilaterally.  No significant edema or varicosities noted.  CFT and skin temperature is normal to both lower extremities.     Neurologic: Lower extremity sensation is intact to light touch.  No evidence of weakness or contracture in the lower extremities.  No evidence of neuropathy.    Dermatologic: Skin is intact to both lower extremities with adequate texture, turgor and tone about the integument.  No paronychia or evidence of soft tissue infection is noted.     Musculoskeletal: Patient is ambulatory with in old Aircast fracture boot tall.  She has pain about the right fifth metatarsal base. "  Ecchymosis edema about the dorsal foot extending into the lesser toes.  Pain only with firm palpation about the proximal fifth metatarsal but not along the proximal peroneals or fibula.  Weakness with peroneal brevis strength testing.  No pain in the Achilles or posterior tibial tendons.    Radiographs: 3 views of the ankle demonstrate a fracture of the fifth metatarsal base but do not allow imaging of the lesser metatarsals otherwise.    Radiographs 3 views of the right foot 7/1/2025 demonstrate avulsion comminuted fracture of the fifth metatarsal base small segment does not articulate with the fourth met.  Minimal displacement.     ASSESSMENT:       ICD-10-CM    1. Closed nondisplaced fracture of fifth metatarsal bone of right foot, initial encounter  S92.354A XR Foot Right G/E 3 Views           PLAN:  Reviewed patient's chart in [facility].      7/1/2025   Interpreted previous radiographs.  Also obtained foot radiographs today to confirm no further fractures about the posterior and lateral foot and rear foot and lesser met's other than the fifth met.  Also to determine no change in alignment from previous imaging.    Recommend minimal weightbearing and only weightbearing in the fracture boot.  Recommend staying in the fracture boot during rest and sleep.  Compression during the day.  3-4 hours total of weightbearing time no lifting more than 30 pounds.  No ladders.  Follow-up again in about 3 weeks to repeat imaging.      Steve Briseno DPM      Again, thank you for allowing me to participate in the care of your patient.        Sincerely,        Steve Briseno DPM    Electronically signed

## 2025-07-01 NOTE — NURSING NOTE
Dispensed 1 Pneumatic Walking Brace, Size small, with [facility] agreement signed by patient. Nida Sandoval CMA, July 1, 2025

## 2025-07-01 NOTE — PROGRESS NOTES
HPI:  Mia Rey is a 20 year old female who is seen in consultation at the request of [facility].    Pt presents for eval of:   (Onset, Location, L/R, Character, Treatments, Injury if yes)    XR Right ankle 6/24/2025, images pushed to PACS from referring clinic     Onset 6/21/2025, while doing a back flip, landed on Right foot/ ankle side ways. Could ambulate with minimal weight. Presents with lateral Right foot and ankle pain, WB w/tall gray fx boot.   Constant swelling, bruising, dull ache pain 1-4/10.     Started wearing boot DOI and only for daily activity.  Ace wrap, rest, elevation.     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    ROS:  10 point ROS neg other than the symptoms noted above in the HPI.    Patient Active Problem List   Diagnosis    Peanut allergy    Tibial plateau fracture, left, closed, initial encounter    Mild major depression (H)    ALETHA (generalized anxiety disorder)    Mild persistent asthma with acute exacerbation    Trouble in sleeping    Deliberate self-cutting       PAST MEDICAL HISTORY:   Past Medical History:   Diagnosis Date    Mild intermittent asthma 10/16/2008        PAST SURGICAL HISTORY: History reviewed. No pertinent surgical history.     MEDICATIONS:   Current Outpatient Medications:     albuterol (PROAIR HFA/PROVENTIL HFA/VENTOLIN HFA) 108 (90 Base) MCG/ACT inhaler, Inhale 2-4 puffs into the lungs every 4 hours as needed for shortness of breath, wheezing or cough. Needs an appointment for further refills., Disp: 18 g, Rfl: 0    BREO ELLIPTA 200-25 MCG/ACT inhaler, Inhale 1 puff into the lungs daily., Disp: 60 each, Rfl: 4    budesonide-formoterol (SYMBICORT) 80-4.5 MCG/ACT Inhaler, Inhale 2 puffs into the lungs 2 times daily., Disp: 10.2 g, Rfl: 4    EPINEPHrine (ANY BX GENERIC EQUIV) 0.3 MG/0.3ML injection 2-pack, INJECT THE CONTENTS OF 1 SYRINGE (0.3ML) INTO THE MUSCLE ONCE AS NEEDED FOR ANAPHYLAXIS, Disp: 1 mL, Rfl: 2    fluticasone (CUTIVATE) 0.05 %  external cream, Apply topically 2 times daily, Disp: 30 g, Rfl: 1    spacer (OPTICHAMBER CECILIO) holding chamber, Use with inhaler, Disp: 1 each, Rfl: 0    azelastine (ASTELIN) 0.1 % nasal spray, Spray 2 sprays into both nostrils 2 times daily as needed for rhinitis. (Patient not taking: Reported on 7/1/2025), Disp: 30 mL, Rfl: 3    Prenatal Vit-Fe Fumarate-FA (PRENATAL MULTIVITAMIN W/IRON) 27-0.8 MG tablet, Take 1 tablet by mouth daily (Patient not taking: Reported on 7/1/2025), Disp: 90 tablet, Rfl: 3    triamcinolone (KENALOG) 0.1 % external ointment, Apply sparingly to affected area twice daily as needed not longer than 14 days in a row. Do not apply on face, neck, armpits and groin area. (Patient not taking: Reported on 7/1/2025), Disp: 80 g, Rfl: 3     ALLERGIES:    Allergies   Allergen Reactions    Peanuts [Nuts] Rash        SOCIAL HISTORY:   Social History     Socioeconomic History    Marital status: Single     Spouse name: Not on file    Number of children: Not on file    Years of education: Not on file    Highest education level: Not on file   Occupational History    Not on file   Tobacco Use    Smoking status: Every Day    Smokeless tobacco: Never    Tobacco comments:     7/1/2025 patient reports vaping     no exposure   Vaping Use    Vaping status: Never Used   Substance and Sexual Activity    Alcohol use: No     Alcohol/week: 0.0 standard drinks of alcohol    Drug use: No    Sexual activity: Yes     Partners: Male     Birth control/protection: Condom, Pill   Other Topics Concern    Not on file   Social History Narrative    October 29, 2024            ENVIRONMENTAL HISTORY: [place] The home is heated with a radiant heat. They does not have central air conditioning. The patient's bedroom is furnished with none.  Pets inside the house include 2 cat(s) and 3 dog(s). There is no history of cockroach or mice infestation. There is/are 1 smokers in the house.  The  house does have a damp basement.      Social Drivers of Health     Financial Resource Strain: Low Risk  (8/28/2024)    Financial Resource Strain     Within the past 12 months, have you or your family members you live with been unable to get utilities (heat, electricity) when it was really needed?: No   Food Insecurity: Low Risk  (8/28/2024)    Food Insecurity     Within the past 12 months, did you worry that your food would run out before you got money to buy more?: No     Within the past 12 months, did the food you bought just not last and you didn t have money to get more?: No   Transportation Needs: Low Risk  (8/28/2024)    Transportation Needs     Within the past 12 months, has lack of transportation kept you from medical appointments, getting your medicines, non-medical meetings or appointments, work, or from getting things that you need?: No   Physical Activity: Sufficiently Active (8/28/2024)    Exercise Vital Sign     Days of Exercise per Week: 4 days     Minutes of Exercise per Session: 40 min   Stress: No Stress Concern Present (8/28/2024)    [employer] - Occupational Stress Questionnaire     Feeling of Stress : Only a little   Social Connections: Unknown (8/28/2024)    Social Connection and Isolation Panel [NHANES]     Frequency of Communication with Friends and Family: Not on file     Frequency of Social Gatherings with Friends and Family: Twice a week     Attends Religion Services: Not on file     Active Member of Clubs or Organizations: Not on file     Attends Club or Organization Meetings: Not on file     Marital Status: Not on file   Interpersonal Safety: Low Risk  (9/3/2024)    Interpersonal Safety     Do you feel physically and emotionally safe where you currently live?: Yes     Within the past 12 months, have you been hit, slapped, kicked or otherwise physically hurt by someone?: No     Within the past 12 months, have you been humiliated or emotionally abused in  "other ways by your partner or ex-partner?: No   Housing Stability: Low Risk  (8/28/2024)    Housing Stability     Do you have housing? : Yes     Are you worried about losing your housing?: No        FAMILY HISTORY:   Family History   Problem Relation Age of Onset    Diabetes Maternal Grandfather     Respiratory Paternal Grandmother         lung cancer-smoker    Cancer Paternal Grandmother         lung cancer    Eye Disorder Maternal Grandmother         EXAM:Vitals: Ht 1.568 m (5' 1.75\")   Wt 54.4 kg (120 lb)   BMI 22.13 kg/m    BMI= Body mass index is 22.13 kg/m .    General appearance: Patient is alert and fully cooperative with history & exam.  No sign of distress is noted during the visit.     Psychiatric: Affect is pleasant & appropriate.  Patient appears motivated to improve health.     Respiratory: Breathing is regular & unlabored while sitting.     HEENT: Hearing is intact to spoken word.  Speech is clear.  No gross evidence of visual impairment that would impact ambulation.     Vascular: DP & PT pulses are intact & regular bilaterally.  No significant edema or varicosities noted.  CFT and skin temperature is normal to both lower extremities.     Neurologic: Lower extremity sensation is intact to light touch.  No evidence of weakness or contracture in the lower extremities.  No evidence of neuropathy.    Dermatologic: Skin is intact to both lower extremities with adequate texture, turgor and tone about the integument.  No paronychia or evidence of soft tissue infection is noted.     Musculoskeletal: Patient is ambulatory with in old Aircast fracture boot tall.  She has pain about the right fifth metatarsal base.  Ecchymosis edema about the dorsal foot extending into the lesser toes.  Pain only with firm palpation about the proximal fifth metatarsal but not along the proximal peroneals or fibula.  Weakness with peroneal brevis strength testing.  No pain in the Achilles or posterior tibial " tendons.    Radiographs: 3 views of the ankle demonstrate a fracture of the fifth metatarsal base but do not allow imaging of the lesser metatarsals otherwise.    Radiographs 3 views of the right foot 7/1/2025 demonstrate avulsion comminuted fracture of the fifth metatarsal base small segment does not articulate with the fourth met.  Minimal displacement.     ASSESSMENT:       ICD-10-CM    1. Closed nondisplaced fracture of fifth metatarsal bone of right foot, initial encounter  S92.354A XR Foot Right G/E 3 Views           PLAN:  Reviewed patient's chart in [facility].      7/1/2025   Interpreted previous radiographs.  Also obtained foot radiographs today to confirm no further fractures about the posterior and lateral foot and rear foot and lesser met's other than the fifth met.  Also to determine no change in alignment from previous imaging.    Recommend minimal weightbearing and only weightbearing in the fracture boot.  Recommend staying in the fracture boot during rest and sleep.  Compression during the day.  3-4 hours total of weightbearing time no lifting more than 30 pounds.  No ladders.  Follow-up again in about 3 weeks to repeat imaging.      Steve Briseno DPM

## 2025-07-01 NOTE — LETTER
July 1, 2025      Mia Rey  [address] [facility] [address]        To Whom It May Concern:    Mia Rey was seen in our clinic. 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  No lifting more more than 30 pounds. No ladders or climbing.  Follow up in 4 weeks.       Sincerely,      Steve Briseno        Electronically signed

## 2025-07-12 ENCOUNTER — MYC REFILL (OUTPATIENT)
Dept: ALLERGY | Facility: CLINIC | Age: 20
End: 2025-07-12
Payer: COMMERCIAL

## 2025-07-12 DIAGNOSIS — J45.40 MODERATE PERSISTENT ASTHMA WITHOUT COMPLICATION: ICD-10-CM

## 2025-07-15 ENCOUNTER — TELEPHONE (OUTPATIENT)
Dept: ALLERGY | Facility: OTHER | Age: 20
End: 2025-07-15
Payer: COMMERCIAL

## 2025-07-15 RX ORDER — ALBUTEROL SULFATE 90 UG/1
2-4 INHALANT RESPIRATORY (INHALATION) EVERY 4 HOURS PRN
Qty: 18 G | Refills: 0 | Status: SHIPPED | OUTPATIENT
Start: 2025-07-15

## 2025-07-15 NOTE — TELEPHONE ENCOUNTER
Signed Prescriptions:                        Disp   Refills    albuterol (PROAIR HFA/PROVENTIL HFA/VENTOL*18 g   0        Sig: Inhale 2-4 puffs into the lungs every 4 hours as           needed for shortness of breath, wheezing or           cough. Needs an appointment for further refills.  Authorizing Provider: BIRA HOOK  Ordering User: NELLI HATCH RN refilled medication per provider verbal order.    Nelli Hatch MSN, RN   [facility], 7/15/2025 11:26 AM

## 2025-07-15 NOTE — TELEPHONE ENCOUNTER
[facility] Call Center    Phone Message    May a detailed message be left on voicemail: yes     Reason for Call: Medication Question or concern regarding medication   Prescription Clarification  Name of Medication: Casing for the albuterol   Prescribing Provider: Ishmael Kuhn MD    Pharmacy: [address]   What on the order needs clarification? Pt is asking if it is possible to get a new casing for the Rx? Pt's original casing was damaged and would like to know if she is able to get a replacement. Thanks        Action Taken: Message routed to:  Other: ER allergy     Travel Screening: Not Applicable     Date of Service:

## 2025-07-15 NOTE — TELEPHONE ENCOUNTER
[employer] message sent recently regarding same issue.  RN responded to that.  Rx sent over per provider verbal order.    Elizabeth Hatch MSN, RN   [facility], 7/15/2025 11:27 AM

## 2025-07-24 ENCOUNTER — OFFICE VISIT (OUTPATIENT)
Dept: PODIATRY | Facility: CLINIC | Age: 20
End: 2025-07-24
Payer: COMMERCIAL

## 2025-07-24 VITALS — BODY MASS INDEX: 22.26 KG/M2 | HEIGHT: 62 IN | WEIGHT: 121 LBS

## 2025-07-24 DIAGNOSIS — S92.354A CLOSED NONDISPLACED FRACTURE OF FIFTH METATARSAL BONE OF RIGHT FOOT, INITIAL ENCOUNTER: Primary | ICD-10-CM

## 2025-07-24 ASSESSMENT — PAIN SCALES - GENERAL: PAINLEVEL_OUTOF10: NO PAIN (0)

## 2025-07-24 NOTE — LETTER
7/24/2025      Mia Rey  [address] [facility] [id number]      Dear Colleague,    Thank you for referring your patient, Mia Rey, to the [facility]. Please see a copy of my visit note below.    Chief Complaint   Patient presents with     RECHECK     (4w5d) WB w/tall gray fx boot, Right 5th metatarsal avulsion fx, DOI 6/21/2025; XR R foot 7/24/2025; LOV 7/1/2025     HPI:  Mia Rey is a 20 year old female who is seen in consultation at the request of [facility].    Pt presents for eval of:   (Onset, Location, L/R, Character, Treatments, Injury if yes)    XR Right ankle 6/24/2025, images pushed to PACS from referring clinic     Onset 6/21/2025, while doing a back flip, landed on Right foot/ ankle side ways. Could ambulate with minimal weight. Presents with lateral Right foot and ankle pain, WB w/tall gray fx boot.   Constant swelling, bruising, dull ache pain 1-4/10.     Started wearing boot DOI and only for daily activity.  Ace wrap, rest, elevation.       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      ROS:  10 point ROS neg other than the symptoms noted above in the HPI.    Patient Active Problem List   Diagnosis     Peanut allergy     Tibial plateau fracture, left, closed, initial encounter     Mild major depression (H)     ALETHA (generalized anxiety disorder)     Mild persistent asthma with acute exacerbation     Trouble in sleeping     Deliberate self-cutting       PAST MEDICAL HISTORY:   Past Medical History:   Diagnosis Date     Mild intermittent asthma 10/16/2008        PAST SURGICAL HISTORY: History reviewed. No pertinent surgical history.     MEDICATIONS:   Current Outpatient Medications:      albuterol (PROAIR HFA/PROVENTIL HFA/VENTOLIN HFA) 108 (90 Base) MCG/ACT inhaler, Inhale 2-4 puffs into the lungs every 4 hours as needed for shortness of breath, wheezing or cough. Needs an appointment for further refills., Disp: 18 g, Rfl: 0      BREO ELLIPTA 200-25 MCG/ACT inhaler, Inhale 1 puff into the lungs daily., Disp: 60 each, Rfl: 4     budesonide-formoterol (SYMBICORT) 80-4.5 MCG/ACT Inhaler, Inhale 2 puffs into the lungs 2 times daily., Disp: 10.2 g, Rfl: 4     fluticasone (CUTIVATE) 0.05 % external cream, Apply topically 2 times daily, Disp: 30 g, Rfl: 1     spacer (OPTICHAMBER CECILIO) holding chamber, Use with inhaler, Disp: 1 each, Rfl: 0     azelastine (ASTELIN) 0.1 % nasal spray, Spray 2 sprays into both nostrils 2 times daily as needed for rhinitis. (Patient not taking: Reported on 7/24/2025), Disp: 30 mL, Rfl: 3     EPINEPHrine (ANY BX GENERIC EQUIV) 0.3 MG/0.3ML injection 2-pack, INJECT THE CONTENTS OF 1 SYRINGE (0.3ML) INTO THE MUSCLE ONCE AS NEEDED FOR ANAPHYLAXIS (Patient not taking: Reported on 7/24/2025), Disp: 1 mL, Rfl: 2     Prenatal Vit-Fe Fumarate-FA (PRENATAL MULTIVITAMIN W/IRON) 27-0.8 MG tablet, Take 1 tablet by mouth daily (Patient not taking: Reported on 7/24/2025), Disp: 90 tablet, Rfl: 3     triamcinolone (KENALOG) 0.1 % external ointment, Apply sparingly to affected area twice daily as needed not longer than 14 days in a row. Do not apply on face, neck, armpits and groin area. (Patient not taking: Reported on 7/24/2025), Disp: 80 g, Rfl: 3     ALLERGIES:    Allergies   Allergen Reactions     Peanuts [Nuts] Rash        SOCIAL HISTORY:   Social History     Socioeconomic History     Marital status: Single     Spouse name: Not on file     Number of children: Not on file     Years of education: Not on file     Highest education level: Not on file   Occupational History     Not on file   Tobacco Use     Smoking status: Every Day     Smokeless tobacco: Never     Tobacco comments:     7/1/2025 patient reports vaping     no exposure   Vaping Use     Vaping status: Never Used   Substance and Sexual Activity     Alcohol use: No     Alcohol/week: 0.0 standard drinks of alcohol     Drug use: No     Sexual activity: Yes     Partners:  Male     Birth control/protection: Condom, Pill   Other Topics Concern     Not on file   Social History Narrative    October 29, 2024            ENVIRONMENTAL HISTORY: [place] is heated with a radiant heat. They does not have central air conditioning. The patient's bedroom is furnished with none.  Pets inside the house include 2 cat(s) and 3 dog(s). There is no history of cockroach or mice infestation. There is/are 1 smokers in the house.  The house does have a damp basement.      Social Drivers of Health     Financial Resource Strain: Low Risk  (8/28/2024)    Financial Resource Strain      Within the past 12 months, have you or your family members you live with been unable to get utilities (heat, electricity) when it was really needed?: No   Food Insecurity: Low Risk  (8/28/2024)    Food Insecurity      Within the past 12 months, did you worry that your food would run out before you got money to buy more?: No      Within the past 12 months, did the food you bought just not last and you didn t have money to get more?: No   Transportation Needs: Low Risk  (8/28/2024)    Transportation Needs      Within the past 12 months, has lack of transportation kept you from medical appointments, getting your medicines, non-medical meetings or appointments, work, or from getting things that you need?: No   Physical Activity: Sufficiently Active (8/28/2024)    Exercise Vital Sign      Days of Exercise per Week: 4 days      Minutes of Exercise per Session: 40 min   Stress: No Stress Concern Present (8/28/2024)    [employer] [place] [employer] - Occupational Stress Questionnaire      Feeling of Stress : Only a little   Social Connections: Unknown (8/28/2024)    Social Connection and Isolation Panel [NHANES]      Frequency of Communication with Friends and Family: Not on file      Frequency of Social Gatherings with Friends and Family: Twice a week      Attends  "[employer] Services: Not on file      Active Member of Clubs or Organizations: Not on file      Attends Club or Organization Meetings: Not on file      Marital Status: Not on file   Interpersonal Safety: Low Risk  (9/3/2024)    Interpersonal Safety      Do you feel physically and emotionally safe where you currently live?: Yes      Within the past 12 months, have you been hit, slapped, kicked or otherwise physically hurt by someone?: No      Within the past 12 months, have you been humiliated or emotionally abused in other ways by your partner or ex-partner?: No   Housing Stability: Low Risk  (8/28/2024)    Housing Stability      Do you have housing? : Yes      Are you worried about losing your housing?: No        FAMILY HISTORY:   Family History   Problem Relation Age of Onset     Diabetes Maternal Grandfather      Respiratory Paternal Grandmother         lung cancer-smoker     Cancer Paternal Grandmother         lung cancer     Eye Disorder Maternal Grandmother         EXAM:Vitals: Ht 1.568 m (5' 1.75\")   Wt 54.9 kg (121 lb)   BMI 22.31 kg/m    BMI= Body mass index is 22.31 kg/m .    General appearance: Patient is alert and fully cooperative with history & exam.  No sign of distress is noted during the visit.     Psychiatric: Affect is pleasant & appropriate.  Patient appears motivated to improve health.     Respiratory: Breathing is regular & unlabored while sitting.     HEENT: Hearing is intact to spoken word.  Speech is clear.  No gross evidence of visual impairment that would impact ambulation.     Vascular: DP & PT pulses are intact & regular bilaterally.  No significant edema or varicosities noted.  CFT and skin temperature is normal to both lower extremities.     Neurologic: Lower extremity sensation is intact to light touch.  No evidence of weakness or contracture in the lower extremities.  No evidence of neuropathy.    Dermatologic: Skin is intact to both lower extremities with adequate texture, turgor " and tone about the integument.  No paronychia or evidence of soft tissue infection is noted.     Musculoskeletal: Patient is ambulatory with in old Aircast fracture boot tall.  She has pain about the right fifth metatarsal base.  Ecchymosis edema about the dorsal foot extending into the lesser toes.  Pain only with firm palpation about the proximal fifth metatarsal but not along the proximal peroneals or fibula.  Weakness with peroneal brevis strength testing.  No pain in the Achilles or posterior tibial tendons.    Radiographs: 3 views of the ankle demonstrate a fracture of the fifth metatarsal base but do not allow imaging of the lesser metatarsals otherwise.    Radiographs 3 views of the right foot 7/1/2025 demonstrate avulsion comminuted fracture of the fifth metatarsal base small segment does not articulate with the fourth met.  Minimal displacement.    Radiographs: 3 views right foot 7/20/2025 demonstrate no change in alignment  With a comminuted avulsion fracture of the fifth metatarsal base.     ASSESSMENT:       ICD-10-CM    1. Closed nondisplaced fracture of fifth metatarsal bone of right foot, initial encounter  S92.354A Physical Therapy  Referral     XR Foot Right G/E 3 Views             PLAN:  Reviewed patient's chart in [facility].      7/1/2025   Interpreted previous radiographs.  Also obtained foot radiographs today to confirm no further fractures about the posterior and lateral foot and rear foot and lesser met's other than the fifth met.  Also to determine no change in alignment from previous imaging.    Recommend minimal weightbearing and only weightbearing in the fracture boot.  Recommend staying in the fracture boot during rest and sleep.  Compression during the day.  3-4 hours total of weightbearing time no lifting more than 30 pounds.  No ladders.  Follow-up again in about 3 weeks to repeat imaging.    7/24/2025    Obtained and interpreted radiographs  Order for PT, may progress as  tolerated.  May remove the fracture boot and even progress weightbearing now during PT.  She may discontinue the fracture boot during rest and sleep if it is tolerated but keep it on place for weightbearing activities at home until about 6 weeks postop then progress out of the boot for light duty activities stay in the boot for long distance standing and walking until about 7 weeks or until she has had about a week of no pain edema or severe weakness or diminished balance.  Then slowly return to regular activity and sturdy shoe gear.  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  Follow-up again in 3 weeks to confirm progress.      Steve Briseno DPM          Again, thank you for allowing me to participate in the care of your patient.        Sincerely,        Steve Briseno DPM    Electronically signed

## 2025-07-24 NOTE — PROGRESS NOTES
Chief Complaint   Patient presents with    RECHECK     (4w5d) WB w/tall gray fx boot, Right 5th metatarsal avulsion fx, DOI 6/21/2025; XR R foot 7/24/2025; LOV 7/1/2025     HPI:  Mia Rey is a 20 year old female who is seen in consultation at the request of [facility].    Pt presents for eval of:   (Onset, Location, L/R, Character, Treatments, Injury if yes)    XR Right ankle 6/24/2025, images pushed to PACS from referring clinic     Onset 6/21/2025, while doing a back flip, landed on Right foot/ ankle side ways. Could ambulate with minimal weight. Presents with lateral Right foot and ankle pain, WB w/tall gray fx boot.   Constant swelling, bruising, dull ache pain 1-4/10.     Started wearing boot DOI and only for daily activity.  Ace wrap, rest, elevation.       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      ROS:  10 point ROS neg other than the symptoms noted above in the HPI.    Patient Active Problem List   Diagnosis    Peanut allergy    Tibial plateau fracture, left, closed, initial encounter    Mild major depression (H)    ALETHA (generalized anxiety disorder)    Mild persistent asthma with acute exacerbation    Trouble in sleeping    Deliberate self-cutting       PAST MEDICAL HISTORY:   Past Medical History:   Diagnosis Date    Mild intermittent asthma 10/16/2008        PAST SURGICAL HISTORY: History reviewed. No pertinent surgical history.     MEDICATIONS:   Current Outpatient Medications:     albuterol (PROAIR HFA/PROVENTIL HFA/VENTOLIN HFA) 108 (90 Base) MCG/ACT inhaler, Inhale 2-4 puffs into the lungs every 4 hours as needed for shortness of breath, wheezing or cough. Needs an appointment for further refills., Disp: 18 g, Rfl: 0    BREO ELLIPTA 200-25 MCG/ACT inhaler, Inhale 1 puff into the lungs daily., Disp: 60 each, Rfl: 4    budesonide-formoterol (SYMBICORT) 80-4.5 MCG/ACT Inhaler, Inhale 2 puffs into the lungs 2 times daily., Disp: 10.2 g, Rfl: 4    fluticasone (CUTIVATE) 0.05 %  external cream, Apply topically 2 times daily, Disp: 30 g, Rfl: 1    spacer (OPTICHAMBER CECILIO) holding chamber, Use with inhaler, Disp: 1 each, Rfl: 0    azelastine (ASTELIN) 0.1 % nasal spray, Spray 2 sprays into both nostrils 2 times daily as needed for rhinitis. (Patient not taking: Reported on 7/24/2025), Disp: 30 mL, Rfl: 3    EPINEPHrine (ANY BX GENERIC EQUIV) 0.3 MG/0.3ML injection 2-pack, INJECT THE CONTENTS OF 1 SYRINGE (0.3ML) INTO THE MUSCLE ONCE AS NEEDED FOR ANAPHYLAXIS (Patient not taking: Reported on 7/24/2025), Disp: 1 mL, Rfl: 2    Prenatal Vit-Fe Fumarate-FA (PRENATAL MULTIVITAMIN W/IRON) 27-0.8 MG tablet, Take 1 tablet by mouth daily (Patient not taking: Reported on 7/24/2025), Disp: 90 tablet, Rfl: 3    triamcinolone (KENALOG) 0.1 % external ointment, Apply sparingly to affected area twice daily as needed not longer than 14 days in a row. Do not apply on face, neck, armpits and groin area. (Patient not taking: Reported on 7/24/2025), Disp: 80 g, Rfl: 3     ALLERGIES:    Allergies   Allergen Reactions    Peanuts [Nuts] Rash        SOCIAL HISTORY:   Social History     Socioeconomic History    Marital status: Single     Spouse name: Not on file    Number of children: Not on file    Years of education: Not on file    Highest education level: Not on file   Occupational History    Not on file   Tobacco Use    Smoking status: Every Day    Smokeless tobacco: Never    Tobacco comments:     7/1/2025 patient reports vaping     no exposure   Vaping Use    Vaping status: Never Used   Substance and Sexual Activity    Alcohol use: No     Alcohol/week: 0.0 standard drinks of alcohol    Drug use: No    Sexual activity: Yes     Partners: Male     Birth control/protection: Condom, Pill   Other Topics Concern    Not on file   Social History Narrative    October 29, 2024            ENVIRONMENTAL HISTORY: [place] The home is heated with a radiant heat. They does not have  central air conditioning. The patient's bedroom is furnished with none.  Pets inside the house include 2 cat(s) and 3 dog(s). There is no history of cockroach or mice infestation. There is/are 1 smokers in the house.  The house does have a damp basement.      Social Drivers of Health     Financial Resource Strain: Low Risk  (8/28/2024)    Financial Resource Strain     Within the past 12 months, have you or your family members you live with been unable to get utilities (heat, electricity) when it was really needed?: No   Food Insecurity: Low Risk  (8/28/2024)    Food Insecurity     Within the past 12 months, did you worry that your food would run out before you got money to buy more?: No     Within the past 12 months, did the food you bought just not last and you didn t have money to get more?: No   Transportation Needs: Low Risk  (8/28/2024)    Transportation Needs     Within the past 12 months, has lack of transportation kept you from medical appointments, getting your medicines, non-medical meetings or appointments, work, or from getting things that you need?: No   Physical Activity: Sufficiently Active (8/28/2024)    Exercise Vital Sign     Days of Exercise per Week: 4 days     Minutes of Exercise per Session: 40 min   Stress: No Stress Concern Present (8/28/2024)    [employer] [place] [employer] - Occupational Stress Questionnaire     Feeling of Stress : Only a little   Social Connections: Unknown (8/28/2024)    Social Connection and Isolation Panel [NHANES]     Frequency of Communication with Friends and Family: Not on file     Frequency of Social Gatherings with Friends and Family: Twice a week     Attends [employer] Services: Not on file     Active Member of Clubs or Organizations: Not on file     Attends Club or Organization Meetings: Not on file     Marital Status: Not on file   Interpersonal Safety: Low Risk  (9/3/2024)    Interpersonal Safety     Do you feel physically and emotionally safe where  "you currently live?: Yes     Within the past 12 months, have you been hit, slapped, kicked or otherwise physically hurt by someone?: No     Within the past 12 months, have you been humiliated or emotionally abused in other ways by your partner or ex-partner?: No   Housing Stability: Low Risk  (8/28/2024)    Housing Stability     Do you have housing? : Yes     Are you worried about losing your housing?: No        FAMILY HISTORY:   Family History   Problem Relation Age of Onset    Diabetes Maternal Grandfather     Respiratory Paternal Grandmother         lung cancer-smoker    Cancer Paternal Grandmother         lung cancer    Eye Disorder Maternal Grandmother         EXAM:Vitals: Ht 1.568 m (5' 1.75\")   Wt 54.9 kg (121 lb)   BMI 22.31 kg/m    BMI= Body mass index is 22.31 kg/m .    General appearance: Patient is alert and fully cooperative with history & exam.  No sign of distress is noted during the visit.     Psychiatric: Affect is pleasant & appropriate.  Patient appears motivated to improve health.     Respiratory: Breathing is regular & unlabored while sitting.     HEENT: Hearing is intact to spoken word.  Speech is clear.  No gross evidence of visual impairment that would impact ambulation.     Vascular: DP & PT pulses are intact & regular bilaterally.  No significant edema or varicosities noted.  CFT and skin temperature is normal to both lower extremities.     Neurologic: Lower extremity sensation is intact to light touch.  No evidence of weakness or contracture in the lower extremities.  No evidence of neuropathy.    Dermatologic: Skin is intact to both lower extremities with adequate texture, turgor and tone about the integument.  No paronychia or evidence of soft tissue infection is noted.     Musculoskeletal: Patient is ambulatory with in old Aircast fracture boot tall.  She has pain about the right fifth metatarsal base.  Ecchymosis edema about the dorsal foot extending into the lesser toes.  Pain only " with firm palpation about the proximal fifth metatarsal but not along the proximal peroneals or fibula.  Weakness with peroneal brevis strength testing.  No pain in the Achilles or posterior tibial tendons.    Radiographs: 3 views of the ankle demonstrate a fracture of the fifth metatarsal base but do not allow imaging of the lesser metatarsals otherwise.    Radiographs 3 views of the right foot 7/1/2025 demonstrate avulsion comminuted fracture of the fifth metatarsal base small segment does not articulate with the fourth met.  Minimal displacement.    Radiographs: 3 views right foot 7/20/2025 demonstrate no change in alignment  With a comminuted avulsion fracture of the fifth metatarsal base.     ASSESSMENT:       ICD-10-CM    1. Closed nondisplaced fracture of fifth metatarsal bone of right foot, initial encounter  S92.354A Physical Therapy  Referral     XR Foot Right G/E 3 Views             PLAN:  Reviewed patient's chart in [facility].      7/1/2025   Interpreted previous radiographs.  Also obtained foot radiographs today to confirm no further fractures about the posterior and lateral foot and rear foot and lesser met's other than the fifth met.  Also to determine no change in alignment from previous imaging.    Recommend minimal weightbearing and only weightbearing in the fracture boot.  Recommend staying in the fracture boot during rest and sleep.  Compression during the day.  3-4 hours total of weightbearing time no lifting more than 30 pounds.  No ladders.  Follow-up again in about 3 weeks to repeat imaging.    7/24/2025    Obtained and interpreted radiographs  Order for PT, may progress as tolerated.  May remove the fracture boot and even progress weightbearing now during PT.  She may discontinue the fracture boot during rest and sleep if it is tolerated but keep it on place for weightbearing activities at home until about 6 weeks postop then progress out of the boot for light duty activities stay in the  boot for long distance standing and walking until about 7 weeks or until she has had about a week of no pain edema or severe weakness or diminished balance.  Then slowly return to regular activity and sturdy shoe gear.  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  Follow-up again in 3 weeks to confirm progress.      Steve Briseno DPM

## 2025-07-29 ENCOUNTER — PATIENT OUTREACH (OUTPATIENT)
Dept: CARE COORDINATION | Facility: CLINIC | Age: 20
End: 2025-07-29
Payer: COMMERCIAL

## 2025-07-30 ENCOUNTER — THERAPY VISIT (OUTPATIENT)
Dept: PHYSICAL THERAPY | Facility: CLINIC | Age: 20
End: 2025-07-30
Attending: PODIATRIST
Payer: COMMERCIAL

## 2025-07-30 DIAGNOSIS — S92.354A CLOSED NONDISPLACED FRACTURE OF FIFTH METATARSAL BONE OF RIGHT FOOT, INITIAL ENCOUNTER: ICD-10-CM

## 2025-07-30 PROCEDURE — 97110 THERAPEUTIC EXERCISES: CPT | Mod: GP | Performed by: PHYSICAL THERAPIST

## 2025-07-30 PROCEDURE — 97161 PT EVAL LOW COMPLEX 20 MIN: CPT | Mod: GP | Performed by: PHYSICAL THERAPIST

## 2025-07-30 ASSESSMENT — ACTIVITIES OF DAILY LIVING (ADL)
PERFORMING_HEAVY_ACTIVITIES_AROUND_YOUR_HOME: NO DIFFICULTY
GETTING_INTO_OR_OUT_OF_A_CAR: NO DIFFICULTY
RUNNING_ON_UNEVEN_GROUND: A LITTLE BIT OF DIFFICULTY
ANY_OF_YOUR_USUAL_WORK,_HOUSEWORK_OR_SCHOOL_ACTIVITIES: NO DIFFICULTY
WALKING_BETWEEN_ROOMS: NO DIFFICULTY
SQUATTING: NO DIFFICULTY
MAKING_SHARP_TURNS_WHILE_RUNNING_FAST: NO DIFFICULTY
PUTTING_ON_YOUR_SHOES_OR_SOCKS: NO DIFFICULTY
ROLLING_OVER_IN_BED: NO DIFFICULTY
LEFS_RAW_SCORE: 0
SITTING_FOR_1_HOUR: NO DIFFICULTY
LIFTING_AN_OBJECT,_LIKE_A_BAG_OF_GROCERIES_FROM_THE_FLOOR: NO DIFFICULTY
GOING_UP_OR_DOWN_10_STAIRS: NO DIFFICULTY
YOUR_USUAL_HOBBIES,_RECREATIONAL_OR_SPORTING_ACTIVITIES: A LITTLE BIT OF DIFFICULTY
SHOPPING: A LITTLE BIT OF DIFFICULTY
WALKING_2_BLOCKS: NO DIFFICULTY
WALKING_A_MILE: NO DIFFICULTY
PLEASE_INDICATE_YOR_PRIMARY_REASON_FOR_REFERRAL_TO_THERAPY:: FOOT AND/OR ANKLE
PERFORMING_LIGHT_ACTIVITIES_AROUND_YOUR_HOME: NO DIFFICULTY
RUNNING_ON_EVEN_GROUND: A LITTLE BIT OF DIFFICULTY
STANDING_FOR_1_HOUR: NO DIFFICULTY
LEFS_SCORE(%): 0
GETTING_INTO_AND_OUT_OF_A_BATH: NO DIFFICULTY

## 2025-07-30 NOTE — PROGRESS NOTES
PHYSICAL THERAPY EVALUATION  Type of Visit: Evaluation        Fall Risk Screen:  Have you fallen 2 or more times in the past year?: No  Have you fallen and had an injury in the past year?: No    Subjective         Presenting condition or subjective complaint: foot fracture referred to PT for foot fracture. Onset: on 6- she was doing a flip in a pool and injured her R foot. Was seen at [facility] and found to have a fifth MT non displaced fracture. In walking boot and is being followed by Dr. Briseno. At this time, able to wean from boot and has been walking barefoot at home.     Date of onset: 06/21/25    Relevant medical history: Asthma   Dates & types of surgery:      Prior diagnostic imaging/testing results: X-ray   Per Epic report:   Narrative & Impression   EXAM: XR FOOT RIGHT G/E 3 VIEWS  LOCATION: [facility]  DATE: 7/24/2025     INDICATION: Closed nondisplaced fracture of fifth metatarsal bone of right foot, initial encounter  COMPARISON: 7/1/2025        Prior therapy history for the same diagnosis, illness or injury: No      Prior Level of Function  Transfers: Independent  Ambulation: Independent  ADL: Independent  IADL: Driving, Finances, Work    Living Environment  Social support: With a significant other or spouse   Type of home: 2-story   Stairs to enter the home: Yes 4 Is there a railing: Yes     Ramp: No   Stairs inside the home: Yes 12 Is there a railing: Yes     Help at home: None  Equipment owned:       Employment: Yes [employer] [place] [employer]  Hobbies/Interests: horses    Patient goals for therapy: return to normal use    Pain assessment:   LOCATION: over fracture site  VAS: currently: 0/10 and increases to 3/10.   Has been out of boot and without shoes on in house.   INCREASE: hit foot on something or dog steps on it  DECREASE: symptoms diminish quickly on its own.        Objective   FOOT/ANKLE EVALUATION    INTEGUMENTARY (edema, incisions): Slight  swelling over the fracture site.   POSTURE: Forward  GAIT:   Weightbearing Status: WBAT  Assistive Device(s): None  Gait Deviations: limp due to walking boot  ROM:   (Degrees) Left AROM Left PROM  Right AROM Right PROM   Ankle Dorsiflexion 8  8    Ankle Plantarflexion 100%  100%    Ankle Inversion 7  7 3   Ankle Eversion 3  3    Great Toe Flexion 70  0    Great Toe Extension 25  25        STRENGTH: not tested today    CLINICAL IMPRESSIONS  Medical Diagnosis: Closed nondisplaced fracture of fifth metatarsal bone of right foot, initial encounter (S92.354A)    Treatment Diagnosis: Closed nondisplaced fracture of fifth metatarsal bone of right foot, initial encounter (S92.354A)   Impression/Assessment: Patient is a 20 year old female with walking on even and uneven ground, standing, steps complaints.  The following significant findings have been identified: Impaired gait and decreased great toe extn R, . These impairments interfere with their ability to perform work tasks, household mobility, and community mobility as compared to previous level of function.     Clinical Decision Making (Complexity):  Clinical Presentation: Stable/Uncomplicated  Clinical Presentation Rationale: based on medical and personal factors listed in PT evaluation  Clinical Decision Making (Complexity): Low complexity    PLAN OF CARE  Treatment Interventions:  Interventions: Gait Training, Neuromuscular Re-education, Therapeutic Activity, Therapeutic Exercise    Long Term Goals     PT Goal 1  Goal Identifier: WB  Goal Description: Mia will gradually progress to 100% WB for walking level, uneven ground and steps with normal pattern  Rationale: to maximize safety and independence with performance of ADLs and functional tasks;to maximize safety and independence within the home;to maximize safety and independence within the community;to maximize safety and independence with self cares  Target Date: 08/15/25  PT Goal 2  Goal Identifier:  Balance  Goal Description: Mia will complete SLS bilaterally 1 x 40+ seconds to demonstrate improved balance and ability to progress to dynamic balance activities so 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  Target Date: 08/29/25  PT Goal 3  Goal Identifier: Strength  Goal Description: Using tindeq, Mia will demonstrate equal strength of ankle bilaterally for standing x 1 hour and walking 1 mile eg for getting into and out of pastures  Target Date: 08/29/25  PT Goal 4  Goal Identifier: Walking pattern  Goal Description: Mia will demonstrate normal gait pattern to demonstrate good ankle and foot ROM and mechanics x 500 feet  Rationale: to maximize safety and independence within the home;to maximize safety and independence within the community  Target Date: 08/29/25      Frequency of Treatment: 1 time per week  Duration of Treatment: 6 weeks    Recommended Referrals to Other Professionals: no needs identified at this time.   Education Assessment:   Learner/Method: Patient;Listening;Reading;Demonstration;Pictures/Video;No Barriers to Learning  Education Comments: see initial evaluation, plan of care, bring supportive shoes/tennis shoes    Risks and benefits of evaluation/treatment have been explained.   Patient/Family/caregiver agrees with Plan of Care.     Evaluation Time:     PT Eval, Low Complexity Minutes (39665): 20   Present: Not applicable     Signing Clinician: Elisha Peters, PT

## 2025-08-12 ENCOUNTER — THERAPY VISIT (OUTPATIENT)
Dept: PHYSICAL THERAPY | Facility: CLINIC | Age: 20
End: 2025-08-12
Attending: PODIATRIST
Payer: COMMERCIAL

## 2025-08-12 DIAGNOSIS — S92.354A CLOSED NONDISPLACED FRACTURE OF FIFTH METATARSAL BONE OF RIGHT FOOT, INITIAL ENCOUNTER: Primary | ICD-10-CM

## 2025-08-12 PROCEDURE — 97112 NEUROMUSCULAR REEDUCATION: CPT | Mod: GP | Performed by: PHYSICAL THERAPIST

## 2025-08-12 PROCEDURE — 97110 THERAPEUTIC EXERCISES: CPT | Mod: GP | Performed by: PHYSICAL THERAPIST

## 2025-08-19 ENCOUNTER — OFFICE VISIT (OUTPATIENT)
Dept: PODIATRY | Facility: CLINIC | Age: 20
End: 2025-08-19
Payer: COMMERCIAL

## 2025-08-19 ENCOUNTER — ANCILLARY PROCEDURE (OUTPATIENT)
Dept: GENERAL RADIOLOGY | Facility: CLINIC | Age: 20
End: 2025-08-19
Attending: PODIATRIST
Payer: COMMERCIAL

## 2025-08-19 VITALS — BODY MASS INDEX: 21.71 KG/M2 | WEIGHT: 118 LBS | HEIGHT: 62 IN

## 2025-08-19 DIAGNOSIS — F17.200 NICOTINE DEPENDENCE, UNCOMPLICATED, UNSPECIFIED NICOTINE PRODUCT TYPE: ICD-10-CM

## 2025-08-19 DIAGNOSIS — S92.354A CLOSED NONDISPLACED FRACTURE OF FIFTH METATARSAL BONE OF RIGHT FOOT, INITIAL ENCOUNTER: ICD-10-CM

## 2025-08-19 DIAGNOSIS — S92.354A CLOSED NONDISPLACED FRACTURE OF FIFTH METATARSAL BONE OF RIGHT FOOT, INITIAL ENCOUNTER: Primary | ICD-10-CM

## 2025-08-19 PROCEDURE — 73630 X-RAY EXAM OF FOOT: CPT | Mod: TC | Performed by: RADIOLOGY

## 2025-08-19 PROCEDURE — 1126F AMNT PAIN NOTED NONE PRSNT: CPT | Performed by: PODIATRIST

## 2025-08-19 PROCEDURE — 99213 OFFICE O/P EST LOW 20 MIN: CPT | Performed by: PODIATRIST

## 2025-08-19 ASSESSMENT — PAIN SCALES - GENERAL: PAINLEVEL_OUTOF10: NO PAIN (0)

## 2025-09-04 ENCOUNTER — THERAPY VISIT (OUTPATIENT)
Dept: PHYSICAL THERAPY | Facility: CLINIC | Age: 20
End: 2025-09-04
Attending: PODIATRIST
Payer: COMMERCIAL

## 2025-09-04 DIAGNOSIS — S92.354A CLOSED NONDISPLACED FRACTURE OF FIFTH METATARSAL BONE OF RIGHT FOOT, INITIAL ENCOUNTER: Primary | ICD-10-CM

## 2025-09-04 PROCEDURE — 97110 THERAPEUTIC EXERCISES: CPT | Mod: GP | Performed by: PHYSICAL THERAPIST
